# Patient Record
Sex: MALE | Race: WHITE | Employment: OTHER | URBAN - METROPOLITAN AREA
[De-identification: names, ages, dates, MRNs, and addresses within clinical notes are randomized per-mention and may not be internally consistent; named-entity substitution may affect disease eponyms.]

---

## 2018-10-11 ENCOUNTER — HOSPITAL ENCOUNTER (EMERGENCY)
Facility: CLINIC | Age: 53
Discharge: HOME | End: 2018-10-11
Attending: EMERGENCY MEDICINE | Admitting: EMERGENCY MEDICINE
Payer: MEDICARE

## 2018-10-11 VITALS
OXYGEN SATURATION: 99 % | TEMPERATURE: 97.7 F | DIASTOLIC BLOOD PRESSURE: 82 MMHG | HEIGHT: 73 IN | RESPIRATION RATE: 20 BRPM | WEIGHT: 189 LBS | SYSTOLIC BLOOD PRESSURE: 105 MMHG | BODY MASS INDEX: 25.05 KG/M2

## 2018-10-11 DIAGNOSIS — K59.03 DRUG-INDUCED CONSTIPATION: ICD-10-CM

## 2018-10-11 DIAGNOSIS — R33.9 URINARY RETENTION WITH INCOMPLETE BLADDER EMPTYING: ICD-10-CM

## 2018-10-11 LAB
ALBUMIN UR-MCNC: NEGATIVE MG/DL
ANION GAP SERPL CALCULATED.3IONS-SCNC: 7 MMOL/L (ref 3–14)
APPEARANCE UR: CLEAR
BILIRUB UR QL STRIP: NEGATIVE
BUN SERPL-MCNC: 11 MG/DL (ref 7–30)
CALCIUM SERPL-MCNC: 8.4 MG/DL (ref 8.5–10.1)
CHLORIDE SERPL-SCNC: 104 MMOL/L (ref 94–109)
CO2 SERPL-SCNC: 26 MMOL/L (ref 20–32)
COLOR UR AUTO: ABNORMAL
CREAT SERPL-MCNC: 1.03 MG/DL (ref 0.66–1.25)
GFR SERPL CREATININE-BSD FRML MDRD: 75 ML/MIN/1.7M2
GLUCOSE SERPL-MCNC: 93 MG/DL (ref 70–99)
GLUCOSE UR STRIP-MCNC: NEGATIVE MG/DL
HGB UR QL STRIP: NEGATIVE
KETONES UR STRIP-MCNC: NEGATIVE MG/DL
LEUKOCYTE ESTERASE UR QL STRIP: NEGATIVE
MUCOUS THREADS #/AREA URNS LPF: PRESENT /LPF
NITRATE UR QL: NEGATIVE
PH UR STRIP: 5 PH (ref 5–7)
POTASSIUM SERPL-SCNC: 3.7 MMOL/L (ref 3.4–5.3)
RBC #/AREA URNS AUTO: 1 /HPF (ref 0–2)
SODIUM SERPL-SCNC: 137 MMOL/L (ref 133–144)
SOURCE: ABNORMAL
SP GR UR STRIP: 1.01 (ref 1–1.03)
UROBILINOGEN UR STRIP-MCNC: 0 MG/DL (ref 0–2)
WBC #/AREA URNS AUTO: 4 /HPF (ref 0–5)

## 2018-10-11 PROCEDURE — 36415 COLL VENOUS BLD VENIPUNCTURE: CPT | Performed by: EMERGENCY MEDICINE

## 2018-10-11 PROCEDURE — 80048 BASIC METABOLIC PNL TOTAL CA: CPT | Performed by: EMERGENCY MEDICINE

## 2018-10-11 PROCEDURE — 51798 US URINE CAPACITY MEASURE: CPT

## 2018-10-11 PROCEDURE — 81001 URINALYSIS AUTO W/SCOPE: CPT | Performed by: EMERGENCY MEDICINE

## 2018-10-11 PROCEDURE — 51702 INSERT TEMP BLADDER CATH: CPT

## 2018-10-11 PROCEDURE — 99284 EMERGENCY DEPT VISIT MOD MDM: CPT | Mod: 25

## 2018-10-11 RX ORDER — ASPIRIN 81 MG
100 TABLET, DELAYED RELEASE (ENTERIC COATED) ORAL DAILY
Qty: 60 TABLET | Refills: 1 | Status: SHIPPED | OUTPATIENT
Start: 2018-10-11

## 2018-10-11 RX ORDER — HYDROXYZINE HCL 10 MG/5 ML
10 SOLUTION, ORAL ORAL 3 TIMES DAILY
COMMUNITY

## 2018-10-11 ASSESSMENT — ENCOUNTER SYMPTOMS: ABDOMINAL PAIN: 0

## 2018-10-11 NOTE — ED PROVIDER NOTES
History     Chief Complaint:  Urinary retention     HPI:   The history is provided by the patient and a parent.      Dominguez Jordan is a 53 year old male with a history of NIDDM and TBI who presents with his mother for evaluation of urinary retention. The patient had left foot surgery 2 days ago with [facility]. He has only been able to urinate small amounts at a time since the surgery. He reports his stream will stop suddenly and will unable to void further despite persistent urge to urinate. He has also not yet had bowel movement since surgery. He is using oxycodone for pain.    Patient went to [facility] today to follow up after surgery. He reported issues with urinary retention. They attempted to place catheter but were unable to do so prompting ER referral.     Upon arrival to the ER, patient was able to void about 150 ml. However, he continues to have urge to urinate and suprapubic discomfort without true abdominal pain. He has no other concerns. No fever, gross hematuria.    Allergies:  Imitrex - anaphylaxis       Medications:    Glipizide   Atarax   Lisinopril   Nortriptyline   Oxycodone     Past Medical History:    Diabetes mellitus   Seizures   TBI     Past Surgical History:    Foot surgery 10/9/18, left     Family History:    History reviewed. No pertinent family history.      Social History:  Presents with mother    Tobacco use: Never smoker   Alcohol use: No   PCP: [facility]       Review of Systems   Constitutional: Negative for fever.   Gastrointestinal: Positive for constipation. Negative for abdominal pain.   Genitourinary: Positive for decreased urine volume and difficulty urinating. Negative for hematuria.   All other systems reviewed and are negative.    Physical Exam     Patient Vitals for the past 24 hrs:   BP Temp Temp src Heart Rate Resp SpO2 Height Weight   10/11/18 1447 - 97.7  F (36.5  C) - - - - - -   10/11/18 1441 (!) 142/96 - - - - - - -   10/11/18  "1438 - - Oral 69 18 98 % 1.854 m (6' 1\") 85.7 kg (189 lb)        Physical Exam  General: Well-developed and well-nourished. Well appearing middle aged  man. Cooperative.  Head:  Atraumatic.  Eyes:  Conjunctivae, lids, and sclerae are normal.  ENT:    Normal nose. Moist mucous membranes.  Neck:  Supple. Normal range of motion.  CV:  Regular rate and rhythm. Normal heart sounds with no murmurs, rubs, or gallops detected.  Resp:  No respiratory distress. Clear to auscultation bilaterally without decreased breath sounds, wheezing, rales, or rhonchi.  GI:  Soft. Non-distended. Discomfort with suprapubic palpation increasing urge to void.  :  No perianal masses, tenderness, fluctuance, or hemorrhoids. Very small amount of medium brown stool in rectal vault. No fecal impaction.  MS:  Left lower leg in splint post-operatively.  Skin:  Warm. Non-diaphoretic. No pallor.  Neuro:  Awake and alert. Normal strength.  Psych: Normal mood and affect. Normal speech.  Vitals reviewed.    Emergency Department Course     Laboratory:  BMP: Calcium 8.4 (L), ow WNL (Creatinine 1.03)   UA with Microscopic: Mucous present, ow Negative      Emergency Department Course:  Past medical records, nursing notes, and vitals reviewed.  1453: I performed an exam of the patient and obtained history, as documented above.    Blood drawn. This was sent to the lab for further testing, results above.   The patient provided a urine sample here in the emergency department. This was sent for laboratory testing, findings above.     1615: I performed a rectal exam. We will try a bishop catheter.     1643: I rechecked the patient. Bishop was established with a lot of urine output (1L). He is feeling better and is ready to go home. Findings and plan explained to the patient and mother. Patient discharged home with instructions regarding supportive care, medications, and reasons to return. The importance of close follow-up was reviewed.      Impression & " Plan      Medical Decision Making:  Dominguez is a 53-year-old man who is postoperative day 2 after orthopedic surgery on his left foot.  He went to orthopedics for some issues with his postoperative recovery and reported to them he is having difficulty completely emptying his bladder.  He continues to feel urge to void.  Reportedly they were unable to pass a urinary catheter and sent patient for further evaluation.  He has no other concerns or complaints though he does note he is constipated as he is using oxycodone.  Patient has no fecal impaction on exam which is remarkable only for discomfort and some fullness in the suprapubis.  He was able to void approximately 150 cc though bladder scan remains with greater than 500 ml remaining.  A Emerson catheter was placed return of 1 L of urine and relief of symptoms.  It is of note that the urinalysis is unremarkable without hematuria or infection.  He also has normal electrolytes with a creatinine of 1.03.  Most likely patient's urinary retention is related to recent general anesthesia in addition to constipation and use of oxycodone.  I do not believe he requires further workup at this time.  However, he will require follow-up with urology for Emerson catheter removal.  Given [facility] had a difficult time placing a catheter I suspect he has BPH and he may require further evaluation of this by urology as well.  I have recommended patient use stool softener while on oxycodone.  I have provided strict return precautions including dysfunction of his Emerson catheter or fever.  I have answered all the patient and his mother's questions and they verbalized understanding.  He is amenable to discharge.    Diagnosis:    ICD-10-CM    1. Urinary retention with incomplete bladder emptying R33.9    2. Drug-induced constipation K59.03        Disposition:  Discharged home with plan as outlined.     Discharge Medications:  New Prescriptions    DOCUSATE SODIUM (COLACE) 100 MG TABLET     Take 100 mg by mouth daily     Scribe Disclosure:   I, David Leigh, am serving as a scribe at 2:53 PM on 10/11/2018 to document services personally performed by Paradise Lugo MD based on my observations and the provider's statements to me.       Paradise Lugo MD  10/11/2018   [facility] EMERGENCY DEPARTMENT       Paradise Lugo MD  10/12/18 1800

## 2018-10-11 NOTE — ED TRIAGE NOTES
Pt c/o urinary retention. LLE foot injury pin surgery Tuesday.  Pt has been having difficulty urinating since. Able to produce urine but feels urge to urinate. ABC in tact. A/OX4

## 2018-10-11 NOTE — DISCHARGE INSTRUCTIONS
Follow-up with urology for Emerson catheter removal and further evaluation.  Take a stool softener or other over-the-counter bowel regimen while you are on oxycodone.  Return immediately with any new concerns including dysfunction of Emerson catheter.  Follow up with [facility] as scheduled.

## 2018-10-11 NOTE — ED NOTES
Emerson instructions given to patient family and pt verbalized understanding of discharge instructions

## 2018-10-11 NOTE — ED AVS SNAPSHOT
[facility] Emergency Department    [address]    [place] [address]    Phone:  [phone]    Fax:  [phone]                                       Dominguez Jordan   MRN: [id number]    Department:  [facility] Emergency Department   Date of Visit:  10/11/2018           After Visit Summary Signature Page     I have received my discharge instructions, and my questions have been answered. I have discussed any challenges I see with this plan with the nurse or doctor.    ..........................................................................................................................................  Patient/Patient Representative Signature      ..........................................................................................................................................  Patient Representative Print Name and Relationship to Patient    ..................................................               ................................................  Date                                   Time    ..........................................................................................................................................  Reviewed by Signature/Title    ...................................................              ..............................................  Date                                               Time          22EPIC Rev 08/18

## 2018-10-11 NOTE — ED AVS SNAPSHOT
[facility] Emergency Department    [address]    [facility] [address]    Phone:  [phone]    Fax:  [phone]                                       Dmoinguez Jordan   MRN: [id number]    Department:  [facility] Emergency Department   Date of Visit:  10/11/2018           Patient Information     Date Of Birth          1965        Your diagnoses for this visit were:     Urinary retention with incomplete bladder emptying     Drug-induced constipation        You were seen by Paradise Lugo MD.      Follow-up Information     Follow up with [facility]. Call in 1 day.    Contact information:    [address]  [address]  [facility] [address] [phone]        Follow up with [facility].    Why:  As scheduled    Contact information:    [address]  [facility] [address] [phone]        Follow up with [facility].    Why:  As needed    Contact information:    [address] [facility] [address]  [phone]          Follow up with [facility] Emergency Department.    Specialty:  EMERGENCY MEDICINE    Why:  If symptoms worsen    Contact information:    [address]  [facility] [address] [phone]        Discharge Instructions       Follow-up with urology for Cisse catheter removal and further evaluation.  Take a stool softener or other over-the-counter bowel regimen while you are on oxycodone.  Return immediately with any new concerns including dysfunction of Cisse catheter.  Follow up with [facility] as scheduled.    Discharge References/Attachments     CONSTIPATION (ADULT) (ENGLISH)    CISSE CATHETER, CARE (ENGLISH)    URINARY RETENTION, MALE (ENGLISH)      24 Hour Appointment Hotline       To make an appointment at any [facility] clinic, call [phone] ([phone]). If you don't have a family doctor or clinic, we will help you  find one. [facility] are conveniently located to serve the needs of you and your family.             Review of your medicines      START taking        Dose / Directions Last dose taken    docusate sodium 100 MG tablet   Commonly known as:  COLACE   Dose:  100 mg   Quantity:  60 tablet        Take 100 mg by mouth daily   Refills:  1          Our records show that you are taking the medicines listed below. If these are incorrect, please call your family doctor or clinic.        Dose / Directions Last dose taken    GLIPIZIDE PO        Refills:  0        hydrOXYzine 10 MG/5ML syrup   Commonly known as:  ATARAX   Dose:  10 mg        Take 10 mg by mouth 3 times daily   Refills:  0        LISINOPRIL PO        Refills:  0        NORTRIPTYLINE HCL PO        Refills:  0        OXYCODONE HCL PO        Refills:  0                Information about OPIOIDS     PRESCRIPTION OPIOIDS: WHAT YOU NEED TO KNOW   We gave you an opioid (narcotic) pain medicine. It is important to manage your pain, but opioids are not always the best choice. You should first try all the other options your care team gave you. Take this medicine for as short a time (and as few doses) as possible.    Some activities can increase your pain, such as bandage changes or therapy sessions. It may help to take your pain medicine 30 to 60 minutes before these activities. Reduce your stress by getting enough sleep, working on hobbies you enjoy and practicing relaxation or meditation. Talk to your care team about ways to manage your pain beyond prescription opioids.    These medicines have risks:    DO NOT drive when on new or higher doses of pain medicine. These medicines can affect your alertness and reaction times, and you could be arrested for driving under the influence (DUI). If you need to use opioids long-term, talk to your care team about driving.    DO NOT operate heavy machinery    DO NOT do any other dangerous activities while taking these  medicines.    DO NOT drink any alcohol while taking these medicines.     If the opioid prescribed includes acetaminophen, DO NOT take with any other medicines that contain acetaminophen. Read all labels carefully. Look for the word  acetaminophen  or  Tylenol.  Ask your pharmacist if you have questions or are unsure.    You can get addicted to pain medicines, especially if you have a history of addiction (chemical, alcohol or substance dependence). Talk to your care team about ways to reduce this risk.    All opioids tend to cause constipation. Drink plenty of water and eat foods that have a lot of fiber, such as fruits, vegetables, prune juice, apple juice and high-fiber cereal. Take a laxative (Miralax, milk of magnesia, Colace, Senna) if you don t move your bowels at least every other day. Other side effects include upset stomach, sleepiness, dizziness, throwing up, tolerance (needing more of the medicine to have the same effect), physical dependence and slowed breathing.    Store your pills in a secure place, locked if possible. We will not replace any lost or stolen medicine. If you don t finish your medicine, please throw away (dispose) as directed by your pharmacist. The [place] [employer]ution [employer] has more information about safe disposal: https://www.pca.[facility].mn.us/living-green/managing-unwanted-medications        Prescriptions were sent or printed at these locations (1 Prescription)                   Other Prescriptions                Printed at Department/Unit printer (1 of 1)         docusate sodium (COLACE) 100 MG tablet                Procedures and tests performed during your visit     Basic metabolic panel    Continue indwelling urinary catheter (Emerson)    UA with Microscopic      Orders Needing Specimen Collection     None      Pending Results     No orders found from 10/9/2018 to 10/12/2018.            Pending Culture Results     No orders found from 10/9/2018 to 10/12/2018.             Pending Results Instructions     If you had any lab results that were not finalized at the time of your Discharge, you can call the ED Lab Result RN at [phone]. You will be contacted by this team for any positive Lab results or changes in treatment. The nurses are available 7 days a week from 10A to 6:30P.  You can leave a message 24 hours per day and they will return your call.        Test Results From Your Hospital Stay        10/11/2018  3:45 PM      Component Results     Component Value Ref Range & Units Status    Color Urine Straw  Final    Appearance Urine Clear  Final    Glucose Urine Negative NEG^Negative mg/dL Final    Bilirubin Urine Negative NEG^Negative Final    Ketones Urine Negative NEG^Negative mg/dL Final    Specific Gravity Urine 1.006 1.003 - 1.035 Final    Blood Urine Negative NEG^Negative Final    pH Urine 5.0 5.0 - 7.0 pH Final    Protein Albumin Urine Negative NEG^Negative mg/dL Final    Urobilinogen mg/dL 0.0 0.0 - 2.0 mg/dL Final    Nitrite Urine Negative NEG^Negative Final    Leukocyte Esterase Urine Negative NEG^Negative Final    Source Midstream Urine  Final    WBC Urine 4 0 - 5 /HPF Final    RBC Urine 1 0 - 2 /HPF Final    Mucous Urine Present (A) NEG^Negative /LPF Final         10/11/2018  3:48 PM      Component Results     Component Value Ref Range & Units Status    Sodium 137 133 - 144 mmol/L Final    Potassium 3.7 3.4 - 5.3 mmol/L Final    Chloride 104 94 - 109 mmol/L Final    Carbon Dioxide 26 20 - 32 mmol/L Final    Anion Gap 7 3 - 14 mmol/L Final    Glucose 93 70 - 99 mg/dL Final    Urea Nitrogen 11 7 - 30 mg/dL Final    Creatinine 1.03 0.66 - 1.25 mg/dL Final    GFR Estimate 75 >60 mL/min/1.7m2 Final    Non  GFR Calc    GFR Estimate If Black >90 >60 mL/min/1.7m2 Final    African American GFR Calc    Calcium 8.4 (L) 8.5 - 10.1 mg/dL Final                Clinical Quality Measure: Blood Pressure Screening     Your blood pressure was checked while you were in  "the emergency department today. The last reading we obtained was  BP: (!) 142/96 . Please read the guidelines below about what these numbers mean and what you should do about them.  If your systolic blood pressure (the top number) is less than 120 and your diastolic blood pressure (the bottom number) is less than 80, then your blood pressure is normal. There is nothing more that you need to do about it.  If your systolic blood pressure (the top number) is 120-139 or your diastolic blood pressure (the bottom number) is 80-89, your blood pressure may be higher than it should be. You should have your blood pressure rechecked within a year by a primary care provider.  If your systolic blood pressure (the top number) is 140 or greater or your diastolic blood pressure (the bottom number) is 90 or greater, you may have high blood pressure. High blood pressure is treatable, but if left untreated over time it can put you at risk for heart attack, stroke, or kidney failure. You should have your blood pressure rechecked by a primary care provider within the next 4 weeks.  If your provider in the emergency department today gave you specific instructions to follow-up with your doctor or provider even sooner than that, you should follow that instruction and not wait for up to 4 weeks for your follow-up visit.        Thank you for choosing [facility]       Thank you for choosing [facility] for your care. Our goal is always to provide you with excellent care. Hearing back from our patients is one way we can continue to improve our services. Please take a few minutes to complete the written survey that you may receive in the mail after you visit with us. Thank you!        [facility] Information     [employer] lets you send messages to your doctor, view your test results, renew your prescriptions, schedule appointments and more. To sign up, go to [email or web address] . Click on \"Log in\" on the left side of the screen, which will take you " "to the Welcome page. Then click on \"Sign up Now\" on the right side of the page.     You will be asked to enter the access code listed below, as well as some personal information. Please follow the directions to create your username and password.     Your access code is: [email or web address]  Expires: 2019  5:03 PM     Your access code will  in 90 days. If you need help or a new code, please call your [facility] clinic or [phone].        Care EveryWhere ID     This is your Care EveryWhere ID. This could be used by other organizations to access your [facility] medical records  [id number]        Equal Access to Services     DMITRIY LOPEZ : Isaiah Sharpe, vera dooley, maritza valdes, ning devlin. [facility] [phone].    ATENCIÓN: Si habla español, tiene a castillo disposición servicios gratuitos de asistencia lingüística. Llame al [phone].    We comply with applicable federal civil rights laws and [place] laws. We do not discriminate on the basis of race, color, national origin, age, disability, sex, sexual orientation, or gender identity.            After Visit Summary       This is your record. Keep this with you and show to your community pharmacist(s) and doctor(s) at your next visit.                  "

## 2018-10-12 ASSESSMENT — ENCOUNTER SYMPTOMS
CONSTIPATION: 1
DIFFICULTY URINATING: 1
FEVER: 0
HEMATURIA: 0

## 2018-10-15 ENCOUNTER — OFFICE VISIT (OUTPATIENT)
Dept: UROLOGY | Facility: CLINIC | Age: 53
End: 2018-10-15
Payer: COMMERCIAL

## 2018-10-15 VITALS
HEIGHT: 73 IN | HEART RATE: 74 BPM | SYSTOLIC BLOOD PRESSURE: 132 MMHG | OXYGEN SATURATION: 99 % | WEIGHT: 190 LBS | BODY MASS INDEX: 25.18 KG/M2 | DIASTOLIC BLOOD PRESSURE: 88 MMHG

## 2018-10-15 DIAGNOSIS — R33.9 URINARY RETENTION: Primary | ICD-10-CM

## 2018-10-15 PROCEDURE — 99203 OFFICE O/P NEW LOW 30 MIN: CPT | Mod: 25 | Performed by: PHYSICIAN ASSISTANT

## 2018-10-15 PROCEDURE — 51700 IRRIGATION OF BLADDER: CPT | Performed by: PHYSICIAN ASSISTANT

## 2018-10-15 RX ORDER — TAMSULOSIN HYDROCHLORIDE 0.4 MG/1
0.4 CAPSULE ORAL DAILY
Qty: 30 CAPSULE | Refills: 3 | Status: SHIPPED | OUTPATIENT
Start: 2018-10-15

## 2018-10-15 RX ORDER — HYDROXYZINE PAMOATE 25 MG/1
CAPSULE ORAL
COMMUNITY
Start: 2018-10-12

## 2018-10-15 ASSESSMENT — PAIN SCALES - GENERAL: PAINLEVEL: SEVERE PAIN (7)

## 2018-10-15 NOTE — NURSING NOTE
Chief Complaint   Patient presents with     Clinic Care Coordination - Follow-up     Pt here for TOV   Pt presented today for a trial of void.  Approximately 350 mL of normal saline instilled into bladder via catheter.  Patient stated he had urge to urinate and catheter was removed without difficulty.  Patient was given a graduated cylinder to measure urine output.  Patient voided approximately 150 mL of clear urine.    Cipro 500 given per protocol: No, per Adriana    Patient did tolerate procedure well.    Teaching done with patient verbally as where to call or go if pain, fever, or unable to urinate post catheter removal.        Shantel Payne, CMA

## 2018-10-15 NOTE — MR AVS SNAPSHOT
"              After Visit Summary   10/15/2018    Dominguez Jordan    MRN: [id number]           Patient Information     Date Of Birth          1965        Visit Information        Provider Department      10/15/2018 2:30 PM Adriana Chacko PA-C [facility]        Today's Diagnoses     Urinary retention    -  1      Care Instructions    Start tamsulosin tonight. One capsule daily.     See you in 1 month.             Follow-ups after your visit        Follow-up notes from your care team     Return in about 4 weeks (around 11/12/2018).      Your next 10 appointments already scheduled     Nov 14, 2018  2:00 PM CST   Return Visit with Adriana Chacko PA-C   [facility] ([facility])    [address]  [address]  [facility] [address] [phone]              Who to contact     If you have questions or need follow up information about today's clinic visit or your schedule please contact [facility] directly at [phone].  Normal or non-critical lab and imaging results will be communicated to you by Phigitalhart, letter or phone within 4 business days after the clinic has received the results. If you do not hear from us within 7 days, please contact the clinic through Teleportt or phone. If you have a critical or abnormal lab result, we will notify you by phone as soon as possible.  Submit refill requests through Minka or call your pharmacy and they will forward the refill request to us. Please allow 3 business days for your refill to be completed.          Additional Information About Your Visit        MyChart Information     Minka lets you send messages to your doctor, view your test results, renew your prescriptions, schedule appointments and more. To sign up, go to [email or web address] . Click on \"Log in\" on the left side of the screen, which will " "take you to the Welcome page. Then click on \"Sign up Now\" on the right side of the page.     You will be asked to enter the access code listed below, as well as some personal information. Please follow the directions to create your username and password.     Your access code is: [email or web address]  Expires: 2019  5:03 PM     Your access code will  in 90 days. If you need help or a new code, please call your [place] clinic or [phone].        Care EveryWhere ID     This is your Care EveryWhere ID. This could be used by other organizations to access your [place] medical records  [id number]        Your Vitals Were     Pulse Height Pulse Oximetry BMI (Body Mass Index)          74 1.854 m (6' 1\") 99% 25.07 kg/m2         Blood Pressure from Last 3 Encounters:   10/15/18 132/88   10/11/18 105/82    Weight from Last 3 Encounters:   10/15/18 86.2 kg (190 lb)   10/11/18 85.7 kg (189 lb)              Today, you had the following     No orders found for display         Today's Medication Changes          These changes are accurate as of 10/15/18  3:24 PM.  If you have any questions, ask your nurse or doctor.               Start taking these medicines.        Dose/Directions    tamsulosin 0.4 MG capsule   Commonly known as:  FLOMAX   Used for:  Urinary retention   Started by:  Adriana Chacko PA-C        Dose:  0.4 mg   Take 1 capsule (0.4 mg) by mouth daily   Quantity:  30 capsule   Refills:  3            Where to get your medicines      These medications were sent to [facility] [address] [address]     Phone:  [phone]     tamsulosin 0.4 MG capsule                Primary Care Provider Office Phone # Fax #    [facility] [phone]       [address] [place] [address]        Equal Access to Services     DMITRIY LOPEZ AH: Isaiah vazquez Sonena, waaxda luqadaha, qaybta kaalcorona valdes, ning connolly " vanessa mendoza'aan ah. So [facility] [phone].    ATENCIÓN: Si habla burt, tiene a castillo disposición servicios gratuitos de asistencia lingüística. Marixa al [phone].    We comply with applicable federal civil rights laws and [place] laws. We do not discriminate on the basis of race, color, national origin, age, disability, sex, sexual orientation, or gender identity.            Thank you!     Thank you for choosing [facility] [place]  for your care. Our goal is always to provide you with excellent care. Hearing back from our patients is one way we can continue to improve our services. Please take a few minutes to complete the written survey that you may receive in the mail after your visit with us. Thank you!             Your Updated Medication List - Protect others around you: Learn how to safely use, store and throw away your medicines at www.disposemymeds.org.          This list is accurate as of 10/15/18  3:24 PM.  Always use your most recent med list.                   Brand Name Dispense Instructions for use Diagnosis    docusate sodium 100 MG tablet    COLACE    60 tablet    Take 100 mg by mouth daily        GLIPIZIDE PO           hydrOXYzine 10 MG/5ML syrup    ATARAX     Take 10 mg by mouth 3 times daily        hydrOXYzine 25 MG capsule    VISTARIL          LISINOPRIL PO           NORTRIPTYLINE HCL PO           OXYCODONE HCL PO           tamsulosin 0.4 MG capsule    FLOMAX    30 capsule    Take 1 capsule (0.4 mg) by mouth daily    Urinary retention

## 2018-10-15 NOTE — LETTER
"10/15/2018       RE: Dominguez Jordan  [address] [facility] [address]     Dear Colleague,    Thank you for referring your patient, Dominguez Jordan, to the [facility] [place] [facility]. Please see a copy of my visit note below.    CC: Urinary retention.    HPI: It is a pleasure to see . Dominguez Jordan, a 53 year old male seen today in the urology clinic in consultation from Dr. Goode of  ER for evaluation of urinary retention following left foot surgery. This developed acutely requiring Emerson catheter placement on 10/11/18. This has been draining well with pale, yellow urine. The patient is tolerating the catheter without issue. No clots of hematuria noted.    The patient has no prior history of AUR or similar symptoms. At baseline, patient does note some hesitancy, frequency. Currently denies fevers, chills, N/V, abdominal/back pain.    No PSA on record.     Past Medical History:   Diagnosis Date     Diabetes (H)      Seizures (H)      TBI (traumatic brain injury) (H)      Past Surgical History:   Procedure Laterality Date     ORTHOPEDIC SURGERY Left 10/09/2018     Current Outpatient Prescriptions   Medication Sig Dispense Refill     docusate sodium (COLACE) 100 MG tablet Take 100 mg by mouth daily 60 tablet 1     GLIPIZIDE PO        hydrOXYzine (ATARAX) 10 MG/5ML syrup Take 10 mg by mouth 3 times daily       LISINOPRIL PO        NORTRIPTYLINE HCL PO        OXYCODONE HCL PO        Allergies   Allergen Reactions     Imitrex [Sumatriptan] Anaphylaxis     Family History: There is no h/o  malignancy.  There is no h/o urolithiasis.     Social History: The patient does not smoke cigarettes.  Denies EtOH and illicit drug use.      PHYSICAL EXAM:   /88 (BP Location: Left arm, Patient Position: Sitting, Cuff Size: Adult Regular)  Pulse 74  Ht 1.854 m (6' 1\")  Wt 86.2 kg (190 lb)  SpO2 99%  BMI 25.07 " kg/m2    GENERAL: Well groomed/well developed/well nourished male in NAD.  HEENT: EOMI, AT, NC.  SKIN: Warm to touch, dry.  No visible rashes or lesions.  RESP: No increased respiratory effort.  LYMPH: No LE edema.  ABD: Soft, NT, ND.  No CVAT.  MS: Full ROM in extremities.  : Emerson catheter indwelling draining yellow urine.  NEURO: Alert and oriented x 3.  PSYCH: Normal mood and affect, pleasant and agreeable during interview and exam.    PROCEDURE: A trial of void was performed by nursing staff today in the clinic.  The patient's Emerson leg bag was disconnected and 350 cc of sterile water were infused into the patient's bladder via gravity drainage, which the patient tolerated fine.  The Emerson balloon was decompressed and the catheter was then removed.  After a few minutes Mr. Jordan voided 200 cc.  The patient did pass today's trial of void and the catheter did not need to be replaced.      REVIEW OF OUTSIDE RECORDS: 5 minutes spent reviewing previous/outside records.    ASSESSMENT/PLAN:  53 year old male who developed acute urinary retention requiring Emerson catheter placement. Has not been taking tamsulosin daily.   The patient successfully passed a trial of void today and should start taking Flomax daily.  The patient should return for a follow up office visit in 1 month for PVR and an AUA symptom score.      Eventually needs KOMAL and PSA.     Should the patient continue to have voiding difficulty, the next appropriate studies would be cystoscopy and/or videourodynamics to better assess bladder function.      I have enjoyed participating in the medical care of this patient.  Please do not hesitate to contact me with any questions or concerns.      Adriana Chacko PA-C  [facility]    30 min spent with the patient, >50% of this time was spent in a face-to-face manner and on coordination of care of urinary retention.

## 2018-10-15 NOTE — PROGRESS NOTES
"CC: Urinary retention.    HPI: It is a pleasure to see . Dominguez Jordan, a 53 year old male seen today in the urology clinic in consultation from Dr. Goode of  ER for evaluation of urinary retention following left foot surgery. This developed acutely requiring Emerson catheter placement on 10/11/18. This has been draining well with pale, yellow urine. The patient is tolerating the catheter without issue. No clots of hematuria noted.    The patient has no prior history of AUR or similar symptoms. At baseline, patient does note some hesitancy, frequency. Currently denies fevers, chills, N/V, abdominal/back pain.    No PSA on record.     Past Medical History:   Diagnosis Date     Diabetes (H)      Seizures (H)      TBI (traumatic brain injury) (H)      Past Surgical History:   Procedure Laterality Date     ORTHOPEDIC SURGERY Left 10/09/2018     Current Outpatient Prescriptions   Medication Sig Dispense Refill     docusate sodium (COLACE) 100 MG tablet Take 100 mg by mouth daily 60 tablet 1     GLIPIZIDE PO        hydrOXYzine (ATARAX) 10 MG/5ML syrup Take 10 mg by mouth 3 times daily       LISINOPRIL PO        NORTRIPTYLINE HCL PO        OXYCODONE HCL PO        Allergies   Allergen Reactions     Imitrex [Sumatriptan] Anaphylaxis     Family History: There is no h/o  malignancy.  There is no h/o urolithiasis.     Social History: The patient does not smoke cigarettes.  Denies EtOH and illicit drug use.      ROS: A comprehensive 14 point ROS was obtained and was  otherwise negative except for that outlined above in the HPI.    PHYSICAL EXAM:   /88 (BP Location: Left arm, Patient Position: Sitting, Cuff Size: Adult Regular)  Pulse 74  Ht 1.854 m (6' 1\")  Wt 86.2 kg (190 lb)  SpO2 99%  BMI 25.07 kg/m2    GENERAL: Well groomed/well developed/well nourished male in NAD.  HEENT: EOMI, AT, NC.  SKIN: Warm to touch, dry.  No visible rashes or lesions.  RESP: No increased respiratory effort.  LYMPH: No LE " edema.  ABD: Soft, NT, ND.  No CVAT.  MS: Full ROM in extremities.  : Emerson catheter indwelling draining yellow urine.  NEURO: Alert and oriented x 3.  PSYCH: Normal mood and affect, pleasant and agreeable during interview and exam.    PROCEDURE: A trial of void was performed by nursing staff today in the clinic.  The patient's Emerson leg bag was disconnected and 350 cc of sterile water were infused into the patient's bladder via gravity drainage, which the patient tolerated fine.  The Emerson balloon was decompressed and the catheter was then removed.  After a few minutes Mr. Jordan voided 200 cc.  The patient did pass today's trial of void and the catheter did not need to be replaced.      REVIEW OF OUTSIDE RECORDS: 5 minutes spent reviewing previous/outside records.    ASSESSMENT/PLAN:  53 year old male who developed acute urinary retention requiring Emerson catheter placement. Has not been taking tamsulosin daily.   The patient successfully passed a trial of void today and should start taking Flomax daily.  The patient should return for a follow up office visit in 1 month for PVR and an AUA symptom score.      Eventually needs KOMAL and PSA.     Should the patient continue to have voiding difficulty, the next appropriate studies would be cystoscopy and/or videourodynamics to better assess bladder function.      I have enjoyed participating in the medical care of this patient.  Please do not hesitate to contact me with any questions or concerns.      Adriana Chacko PA-C  [facility]    30 min spent with the patient, >50% of this time was spent in a face-to-face manner and on coordination of care of urinary retention.

## 2018-10-29 ENCOUNTER — HOSPITAL ENCOUNTER (EMERGENCY)
Facility: CLINIC | Age: 53
Discharge: HOME | End: 2018-10-29
Attending: EMERGENCY MEDICINE | Admitting: EMERGENCY MEDICINE
Payer: MEDICARE

## 2018-10-29 ENCOUNTER — APPOINTMENT (OUTPATIENT)
Dept: ULTRASOUND IMAGING | Facility: CLINIC | Age: 53
End: 2018-10-29
Attending: EMERGENCY MEDICINE
Payer: MEDICARE

## 2018-10-29 VITALS
DIASTOLIC BLOOD PRESSURE: 83 MMHG | HEIGHT: 73 IN | RESPIRATION RATE: 18 BRPM | WEIGHT: 190 LBS | TEMPERATURE: 97.5 F | OXYGEN SATURATION: 97 % | SYSTOLIC BLOOD PRESSURE: 114 MMHG | BODY MASS INDEX: 25.18 KG/M2

## 2018-10-29 DIAGNOSIS — R25.2 CRAMP IN LOWER LEG: ICD-10-CM

## 2018-10-29 DIAGNOSIS — Z48.89 ENCOUNTER FOR POSTOPERATIVE WOUND CHECK: ICD-10-CM

## 2018-10-29 PROCEDURE — 93971 EXTREMITY STUDY: CPT | Mod: LT

## 2018-10-29 PROCEDURE — 99284 EMERGENCY DEPT VISIT MOD MDM: CPT | Mod: 25

## 2018-10-29 ASSESSMENT — ENCOUNTER SYMPTOMS
FEVER: 0
VOMITING: 0
COLOR CHANGE: 1
NUMBNESS: 0
NAUSEA: 0

## 2018-10-29 NOTE — ED TRIAGE NOTES
Pt had tendon surgery on left foot on Oct 9th.  He now has increased pain in foot and swelling when foot is down.  Pt is sent to ED to rule out blood clot.

## 2018-10-29 NOTE — ED PROVIDER NOTES
History     Chief Complaint:  Foot Pain    The history is provided by the patient and a friend.      Dominguez Jordan is a 53 year old male with a history of NIDDM and TBI who presents with left foot pain, status post left foot tendon surgery on 10/09. Patient had his splint and stitches removed 5 days ago. Things seem to be going well though he continues to have pain in the foot requiring 1 tablet of oxycodone every 4 hours. However, yesterday the patient's ex-wife noticed a bluish discoloration when he was sitting with his feet hanging down. The discoloration seemed to start at the heel and move around to the foot and up towards the ankle. He did have associated swelling so since this incident the patient has been very careful about keeping the foot elevated. There has been no further episodes of discoloration and swelling is improved. He did, however, have a pain in is left lower leg/ankle last night that was cramping in quality and persists mildly today. Thus, today, patient's ex-wife called [facility] and described these symptoms. He was referred to the ER given concern for DVT. Of note, the surgical site is somewhat red. The patient and ex-wife state they believe this is from him itching the site. The ex-wife has picture of day the stitches were removed and there is a similar amount of redness on that photo 5 days ago. The patient notes pain has not increased in the last 5 days nor has he had any fever. He denies numbness or tingling. He has had no nausea or vomiting.    Allergies:  Imitrex [Sumatriptan]  Depakote [Valproic Acid]    Medications:    Glipizide  Lisinopril  Colace  Vistaril  Atarax    Past Medical History:    Diabetes  TBI  Seizures     Past Surgical History:    Orthopedic surgery, left foot    Family History:    History reviewed. No pertinent family history.     Social History:  The patient was accompanied to the ED by ex-wife.  Smoking Status: Never  Smokeless Tobacco:  "Never  Alcohol Use: No  Marital Status:  Single   PCP: [facility]       Review of Systems   Constitutional: Negative for fever.   Cardiovascular: Positive for leg swelling.   Gastrointestinal: Negative for nausea and vomiting.   Musculoskeletal: Positive for myalgias (left calf/ankle cramp).   Skin: Positive for color change and wound (surgical).   Neurological: Negative for numbness.   All other systems reviewed and are negative.    Physical Exam     Patient Vitals for the past 24 hrs:   BP Temp Temp src Heart Rate Resp SpO2 Height Weight   10/29/18 1519 114/83 97.5  F (36.4  C) Temporal 82 18 97 % 1.854 m (6' 1\") 86.2 kg (190 lb)     Physical Exam  General: Well-developed and well-nourished. Well appearing middle aged  man. Cooperative.  Head:  Atraumatic.  Eyes:  Conjunctivae, lids, and sclerae are normal.  ENT:    Normal nose. Moist mucous membranes.  Neck:  Supple. Normal range of motion.  CV:  DP 2+ bilaterally with distal capillary refill <3 seconds throughout.  Resp:  No respiratory distress.   GI:  Non-distended.    MS:  Normal ROM. No calf tenderness or edema. Mild edema to the left foot and second toe without color change aside from focal erythema around a well healing surgical site (photo below) with no significant warmth or tenderness. Cannot express discharge from wound. Compartments soft. No focal tenderness to palpation of the lower leg, ankle, or foot on the left.   Skin:  Warm. Non-diaphoretic. No pallor. See photo below.  Neuro: Awake and alert. Normal strength and sensation to the left foot.  Psych:  Normal mood and affect. Normal speech.  Vitals reviewed.            Emergency Department Course     Imaging:  Radiology findings were communicated with the patient who voiced understanding of the findings.  US Lower Extremity Venous Duplex Left:  No DVT demonstrated.    HELEN REA MD    Emergency Department Course:  Nursing notes and vitals reviewed.    The patient was sent " "for an ultrasound of the lower extremity while in the emergency department, results above.     1532: I performed an exam of the patient as documented above.     1655: Patient rechecked and updated.     Findings and plan explained to the patient. Patient discharged home with instructions regarding supportive care, medications, and reasons to return. The importance of close follow-up was reviewed.     Impression & Plan      Medical Decision Making:  Dominguez is a 53-year-old man who had a orthopedic left foot surgery and is postoperative day 20 presenting with concern for DVT.  Patient had his splint and sutures removed as well as a pin on postoperative day 15.  Since that time he has continued using oxycodone every 4 hours which does relieve his pain.  It has not significantly worsened recently though he did have left calf pain described as a cramp yesterday that persists mildly now.  This, in combination with color change of the foot which his ex-wife describes as \"blue\" prompted a call to the [facility] and referral for evaluation for DVT.  Ex-wife does note that this skin discoloration improved when they elevated the extremity and she believes the swelling has improved as well.  On exam, there is no significant swelling and no skin discoloration.  He is neurovascularly intact with 2+ DP pulse.  Compartments are soft and there is no significant calf tenderness or swelling.  There is a small amount of redness around the surgical site itself though it is not latanya cellulitis and patient's ex-wife shows me a picture on her phone of the day the sutures removed and this redness is unchanged from that time.  He does report that it is been somewhat itchy and he has been itching at this area and I believe this redness is unlikely to represent a true postoperative surgical site infection or cellulitis.  Patient was sent for duplex US and, fortunately, this shows no DVT.  At this time, there is no significant concern for " "postoperative site infection, compartment syndrome, or DVT.  Further imaging is not indicated, particularly as his pain has not particularly worsened recently.  I do not believe antibiotics are indicated for the redness around the surgical site though I have demarcated the area and recommended close monitoring.  If the redness extends, he would warrant antibiotics at that time.  However, it has not extended in the last 5 days and he has no systemic signs of illness such as fever.  Exact etiology of the \"blue\" discoloration described by ex-wife is unclear.  I suspect it was related to dependent edema given it improved with elevation.  I have recommended they continue elevating and continue his oxycodone.  I have recommended they monitor closely for a blue discoloration as they noticed before and also monitoring the redness that was demarcated as above with strict return precautions for surgical site infection.  He does not have an appointment for another 3-4 weeks with [facility] so I have recommended they call today or tomorrow to get first available appointment for recheck of his surgical site and to discuss his ER visit and ongoing pain with calf cramp and reported skin discoloration.  I answered all the patient and his ex-wife's questions and they verbalized understanding.  They are amenable to discharge with [facility] follow-up and strict return precautions.    Diagnosis:    ICD-10-CM    1. Cramp in lower leg R25.2    2. Encounter for postoperative wound check Z48.89        Disposition:  discharged home      Dorene Johnson  10/29/2018   [facility] EMERGENCY DEPARTMENT  I, Dorene Johnson, am serving as a scribe at 3:32 PM on 10/29/2018 to document services personally performed by Paradise Lugo MD based on my observations and the provider's statements to me.       Paradise Lugo MD  10/31/18 [id number]    "

## 2018-10-29 NOTE — ED AVS SNAPSHOT
[facility] Emergency Department    [address]    [facility] [address]    Phone:  [phone]    Fax:  [phone]                                       Dominguez Jordan   MRN: [id number]    Department:  [facility] Emergency Department   Date of Visit:  10/29/2018           Patient Information     Date Of Birth          1965        Your diagnoses for this visit were:     Cramp in lower leg     Encounter for postoperative wound check        You were seen by Paradise Lugo MD.      Follow-up Information     Follow up with [facility] In 4 days.    Why:  Arrange follow up appointment later this week    Contact information:    [address]  [address]  [facility] [address]          Follow up with [facility].    Why:  As needed    Contact information:    [address] [facility] [address] [phone]          Follow up with [facility] Emergency Department.    Specialty:  EMERGENCY MEDICINE    Why:  If symptoms worsen    Contact information:    [address]  [facility] [address] [phone]        Discharge Instructions       Continue elevating.  Continue oxycodone.  Monitor for color change like you noticed before.   Monitor for redness. The amount today is similar to picture when stitches were removed, but if redness extends beyond marking or if there is discharge, fever >100.4F, vomiting, or any other concerns, call [facility] or return to ER immediately.  Call to arrange an earlier appointment with [facility] for wound check and discuss symptoms in the next week or so.    Discharge References/Attachments     POST OP WOUND CHECK, GENERAL (ENGLISH)      Your next 10 appointments already scheduled     Nov 14, 2018  2:00 PM CST   Return Visit with Adriana Chacko PA-C   [facility] [place] ([facility])    [address]  [address]  [facility] [id number] [phone]              24 Hour Appointment Hotline       To make an appointment at any [facility], call [phone] ([phone]). If you don't have a family doctor or clinic, we will help you find one. [facility] clinics are conveniently located to serve the needs of you and your family.             Review of your medicines      Our records show that you are taking the medicines listed below. If these are incorrect, please call your family doctor or clinic.        Dose / Directions Last dose taken    docusate sodium 100 MG tablet   Commonly known as:  COLACE   Dose:  100 mg   Quantity:  60 tablet        Take 100 mg by mouth daily   Refills:  1        GLIPIZIDE PO        Refills:  0        hydrOXYzine 10 MG/5ML syrup   Commonly known as:  ATARAX   Dose:  10 mg        Take 10 mg by mouth 3 times daily   Refills:  0        hydrOXYzine 25 MG capsule   Commonly known as:  VISTARIL        Refills:  0        LISINOPRIL PO        Refills:  0        NORTRIPTYLINE HCL PO        Refills:  0        OXYCODONE HCL PO        Refills:  0        tamsulosin 0.4 MG capsule   Commonly known as:  FLOMAX   Dose:  0.4 mg   Quantity:  30 capsule        Take 1 capsule (0.4 mg) by mouth daily   Refills:  3                Procedures and tests performed during your visit     US Lower Extremity Venous Duplex Left      Orders Needing Specimen Collection     None      Pending Results     No orders found from 10/27/2018 to 10/30/2018.            Pending Culture Results     No orders found from 10/27/2018 to 10/30/2018.            Pending Results Instructions     If you had any lab results that were not finalized at the time of your Discharge, you can call the ED Lab Result RN at [phone]. You will be contacted by this team for any positive Lab results or changes in treatment. The nurses are available 7 days a week from 10A to 6:30P.  You can leave a message 24 hours per day and they will return your  call.        Test Results From Your Hospital Stay        10/29/2018  4:30 PM      Narrative     ULTRASOUND VENOUS LOWER EXTREMITY UNILATERAL LEFT  10/29/2018 4:19 PM     HISTORY: Swelling, pain, concern for deep venous thrombosis  postoperatively.     COMPARISON: None.    TECHNIQUE: Ultrasound gray scale, Color Doppler flow, and spectral  Doppler waveform analysis performed.    FINDINGS:  The left common femoral, superficial femoral, popliteal and  posterior tibial veins are patent and fully compressible and  demonstrate normal venous Doppler flow. The visualized greater  saphenous vein is negative for thrombus. For comparison the right  common femoral vein was evaluated and was unremarkable.        Impression     IMPRESSION: No DVT demonstrated.    HELEN REA MD                Clinical Quality Measure: Blood Pressure Screening     Your blood pressure was checked while you were in the emergency department today. The last reading we obtained was  BP: 114/83 . Please read the guidelines below about what these numbers mean and what you should do about them.  If your systolic blood pressure (the top number) is less than 120 and your diastolic blood pressure (the bottom number) is less than 80, then your blood pressure is normal. There is nothing more that you need to do about it.  If your systolic blood pressure (the top number) is 120-139 or your diastolic blood pressure (the bottom number) is 80-89, your blood pressure may be higher than it should be. You should have your blood pressure rechecked within a year by a primary care provider.  If your systolic blood pressure (the top number) is 140 or greater or your diastolic blood pressure (the bottom number) is 90 or greater, you may have high blood pressure. High blood pressure is treatable, but if left untreated over time it can put you at risk for heart attack, stroke, or kidney failure. You should have your blood pressure rechecked by a primary care provider  "within the next 4 weeks.  If your provider in the emergency department today gave you specific instructions to follow-up with your doctor or provider even sooner than that, you should follow that instruction and not wait for up to 4 weeks for your follow-up visit.        Thank you for choosing [facility]       Thank you for choosing [facility] for your care. Our goal is always to provide you with excellent care. Hearing back from our patients is one way we can continue to improve our services. Please take a few minutes to complete the written survey that you may receive in the mail after you visit with us. Thank you!        [email or web address] Information     [email or web address] lets you send messages to your doctor, view your test results, renew your prescriptions, schedule appointments and more. To sign up, go to [email or web address] . Click on \"Log in\" on the left side of the screen, which will take you to the Welcome page. Then click on \"Sign up Now\" on the right side of the page.     You will be asked to enter the access code listed below, as well as some personal information. Please follow the directions to create your username and password.     Your access code is: [email or web address]  Expires: 2019  5:03 PM     Your access code will  in 90 days. If you need help or a new code, please call your [facility] clinic or [phone].        Care EveryWhere ID     This is your Care EveryWhere ID. This could be used by other organizations to access your [facility] medical records  [id number]        Equal Access to Services     DMITRIY LOPEZ AH: Hadii eugene Sharpe, waaxda luqadaha, qaybta kaalmada lucio, ning lin . So [facility] [phone].    ATENCIÓN: Si habla español, tiene a castillo disposición servicios gratuitos de asistencia lingüística. Marixa al [phone].    We comply with applicable federal civil rights laws and [place] laws. We do not discriminate on the basis of race, color, national " origin, age, disability, sex, sexual orientation, or gender identity.            After Visit Summary       This is your record. Keep this with you and show to your community pharmacist(s) and doctor(s) at your next visit.

## 2018-10-29 NOTE — ED AVS SNAPSHOT
[facility] Emergency Department    [address]    [facility] [address]    Phone:  [phone]    Fax:  [phone]                                       Dominguez Jordan   MRN: [id number]    Department:  [facility] Emergency Department   Date of Visit:  10/29/2018           After Visit Summary Signature Page     I have received my discharge instructions, and my questions have been answered. I have discussed any challenges I see with this plan with the nurse or doctor.    ..........................................................................................................................................  Patient/Patient Representative Signature      ..........................................................................................................................................  Patient Representative Print Name and Relationship to Patient    ..................................................               ................................................  Date                                   Time    ..........................................................................................................................................  Reviewed by Signature/Title    ...................................................              ..............................................  Date                                               Time          22EPIC Rev 08/18

## 2018-10-29 NOTE — DISCHARGE INSTRUCTIONS
Continue elevating.  Continue oxycodone.  Monitor for color change like you noticed before.   Monitor for redness. The amount today is similar to picture when stitches were removed, but if redness extends beyond marking or if there is discharge, fever >100.4F, vomiting, or any other concerns, call [facility] or return to ER immediately.  Call to arrange an earlier appointment with [facility] for wound check and discuss symptoms in the next week or so.

## 2018-10-31 ASSESSMENT — ENCOUNTER SYMPTOMS
MYALGIAS: 1
WOUND: 1

## 2018-11-14 ENCOUNTER — OFFICE VISIT (OUTPATIENT)
Dept: UROLOGY | Facility: CLINIC | Age: 53
End: 2018-11-14
Payer: COMMERCIAL

## 2018-11-14 VITALS — WEIGHT: 190 LBS | OXYGEN SATURATION: 98 % | HEART RATE: 72 BPM | BODY MASS INDEX: 25.18 KG/M2 | HEIGHT: 73 IN

## 2018-11-14 DIAGNOSIS — R33.9 URINARY RETENTION: Primary | ICD-10-CM

## 2018-11-14 DIAGNOSIS — Z12.5 SCREENING FOR PROSTATE CANCER: ICD-10-CM

## 2018-11-14 LAB
ALBUMIN UR-MCNC: NEGATIVE MG/DL
APPEARANCE UR: CLEAR
BILIRUB UR QL STRIP: NEGATIVE
COLOR UR AUTO: YELLOW
GLUCOSE UR STRIP-MCNC: NEGATIVE MG/DL
HGB UR QL STRIP: NEGATIVE
KETONES UR STRIP-MCNC: NEGATIVE MG/DL
LEUKOCYTE ESTERASE UR QL STRIP: NEGATIVE
NITRATE UR QL: NEGATIVE
PH UR STRIP: 5.5 PH (ref 5–7)
RESIDUAL VOLUME (RV) (EXTERNAL): 30
SOURCE: NORMAL
SP GR UR STRIP: 1.02 (ref 1–1.03)
UROBILINOGEN UR STRIP-ACNC: 0.2 EU/DL (ref 0.2–1)

## 2018-11-14 PROCEDURE — 99213 OFFICE O/P EST LOW 20 MIN: CPT | Performed by: PHYSICIAN ASSISTANT

## 2018-11-14 PROCEDURE — 81003 URINALYSIS AUTO W/O SCOPE: CPT | Mod: QW | Performed by: PHYSICIAN ASSISTANT

## 2018-11-14 ASSESSMENT — PAIN SCALES - GENERAL: PAINLEVEL: EXTREME PAIN (8)

## 2018-11-14 NOTE — NURSING NOTE
Pt says he is better now than last time he was here.  Pt having foot problems.  PARDEEP Funes, SAMPSON

## 2018-11-14 NOTE — PROGRESS NOTES
"CC: Urinary retention.    HPI: It is a pleasure to see . Dominguez Jordan, a 53 year old male seen today in the urology clinic in follow up of urinary retention following left foot surgery. This developed acutely requiring Emerson catheter placement on 10/11/18. TOV passed 4 weeks ago.    The patient has no prior history of AUR or similar symptoms. At baseline, patient does note some hesitancy, frequency. Currently denies fevers, chills, N/V, abdominal/back pain.    No PSA on record.  Flomax seems to be helping his hesitancy and frequency. No SE noted.     Past Medical History:   Diagnosis Date     Diabetes (H)      Seizures (H)      TBI (traumatic brain injury) (H)      Past Surgical History:   Procedure Laterality Date     ORTHOPEDIC SURGERY Left 10/09/2018     Current Outpatient Prescriptions   Medication Sig Dispense Refill     docusate sodium (COLACE) 100 MG tablet Take 100 mg by mouth daily 60 tablet 1     GLIPIZIDE PO        LISINOPRIL PO        NORTRIPTYLINE HCL PO        OXYCODONE HCL PO        tamsulosin (FLOMAX) 0.4 MG capsule Take 1 capsule (0.4 mg) by mouth daily 30 capsule 3     hydrOXYzine (ATARAX) 10 MG/5ML syrup Take 10 mg by mouth 3 times daily       hydrOXYzine (VISTARIL) 25 MG capsule        Allergies   Allergen Reactions     Imitrex [Sumatriptan] Anaphylaxis     Depakote [Valproic Acid]      Liver problem     Family History: There is no h/o  malignancy.  There is no h/o urolithiasis.     Social History: The patient does not smoke cigarettes.  Denies EtOH and illicit drug use.      ROS: A comprehensive 14 point ROS was obtained and was  otherwise negative except for that outlined above in the HPI.    PHYSICAL EXAM:   Pulse 72  Ht 1.854 m (6' 1\")  Wt 86.2 kg (190 lb)  SpO2 98%  BMI 25.07 kg/m2  GEN: NAD  EYES: EOMI  MOUTH: MMM  NECK: Supple  RESP: Unlabored breathing  CARDIAC: No LE edema  SKIN: Warm  ABD: soft  NEURO: AAO  KOMAL: normal tone, no masses, (medium) prostate without nodules " or tenderness.    REVIEW OF OUTSIDE RECORDS: 5 minutes spent reviewing previous/outside records.    ASSESSMENT/PLAN:  53 year old male who developed acute urinary retention requiring Emerson catheter placement, resolved  -PSA and office visit in 4 wks  -Continue Flodilip Chacko PA-C  [facility]    20 min spent with the patient, >50% of this time was spent in a face-to-face manner and on coordination of care of urinary retention.

## 2018-11-14 NOTE — LETTER
11/14/2018       RE: Dominguez Jordan  [address] [facility] [address]     Dear Colleague,    Thank you for referring your patient, Dominguez Jordan, to the [facility] [place] at [facility]. Please see a copy of my visit note below.    CC: Urinary retention.    HPI: It is a pleasure to see Mr. Dominguez Jordan, a 53 year old male seen today in the urology clinic in follow up of urinary retention following left foot surgery. This developed acutely requiring Emerson catheter placement on 10/11/18. TOV passed 4 weeks ago.    The patient has no prior history of AUR or similar symptoms. At baseline, patient does note some hesitancy, frequency. Currently denies fevers, chills, N/V, abdominal/back pain.    No PSA on record.  Flomax seems to be helping his hesitancy and frequency. No SE noted.     Past Medical History:   Diagnosis Date     Diabetes (H)      Seizures (H)      TBI (traumatic brain injury) (H)      Past Surgical History:   Procedure Laterality Date     ORTHOPEDIC SURGERY Left 10/09/2018     Current Outpatient Prescriptions   Medication Sig Dispense Refill     docusate sodium (COLACE) 100 MG tablet Take 100 mg by mouth daily 60 tablet 1     GLIPIZIDE PO        LISINOPRIL PO        NORTRIPTYLINE HCL PO        OXYCODONE HCL PO        tamsulosin (FLOMAX) 0.4 MG capsule Take 1 capsule (0.4 mg) by mouth daily 30 capsule 3     hydrOXYzine (ATARAX) 10 MG/5ML syrup Take 10 mg by mouth 3 times daily       hydrOXYzine (VISTARIL) 25 MG capsule        Allergies   Allergen Reactions     Imitrex [Sumatriptan] Anaphylaxis     Depakote [Valproic Acid]      Liver problem     Family History: There is no h/o  malignancy.  There is no h/o urolithiasis.     Social History: The patient does not smoke cigarettes.  Denies EtOH and illicit drug use.      ROS: A comprehensive 14 point ROS was obtained and was  otherwise negative except for that  "outlined above in the HPI.    PHYSICAL EXAM:   Pulse 72  Ht 1.854 m (6' 1\")  Wt 86.2 kg (190 lb)  SpO2 98%  BMI 25.07 kg/m2  GEN: NAD  EYES: EOMI  MOUTH: MMM  NECK: Supple  RESP: Unlabored breathing  CARDIAC: No LE edema  SKIN: Warm  ABD: soft  NEURO: AAO  KOMAL: normal tone, no masses, (medium) prostate without nodules or tenderness.    REVIEW OF OUTSIDE RECORDS: 5 minutes spent reviewing previous/outside records.    ASSESSMENT/PLAN:  53 year old male who developed acute urinary retention requiring Emerson catheter placement, resolved  -PSA and office visit in 4 wks  -Continue Flomax    Adriana Chacko PA-C  [facility]    20 min spent with the patient, >50% of this time was spent in a face-to-face manner and on coordination of care of urinary retention.       "

## 2018-11-14 NOTE — PATIENT INSTRUCTIONS
See you in 4 weeks. Will have you go to the lab 1-2 days prior to seeing me for a blood draw (PSA screening test for the prostate).    Flomax to continue daily.

## 2018-11-14 NOTE — MR AVS SNAPSHOT
After Visit Summary   11/14/2018    Dominguez Jordan    MRN: [id number]           Patient Information     Date Of Birth          1965        Visit Information        Provider Department      11/14/2018 2:00 PM Adriana Chacko PA-C [facility]        Today's Diagnoses     Urinary retention    -  1    Screening for prostate cancer          Care Instructions    See you in 4 weeks. Will have you go to the lab 1-2 days prior to seeing me for a blood draw (PSA screening test for the prostate).    Flomax to continue daily.           Follow-ups after your visit        Follow-up notes from your care team     Return in about 4 weeks (around 12/12/2018).      Your next 10 appointments already scheduled     Dec 12, 2018 10:00 AM CST   LAB with [facility]   [facility]    [address]  [facility] [address]   [phone]           Please do not eat 10-12 hours before your appointment if you are coming in fasting for labs on lipids, cholesterol, or glucose (sugar). This does not apply to pregnant women. Water, hot tea and black coffee (with nothing added) are okay. Do not drink other fluids, diet soda or chew gum.            Dec 19, 2018  3:30 PM CST   Return Visit with Adriana Chacko PA-C   [facility] ([facility])    [address] [facility]  [address]  [facility] [id number] [phone]              Future tests that were ordered for you today     Open Future Orders        Priority Expected Expires Ordered    PSA tumor marker [[id number]] Routine  11/14/2019 11/14/2018            Who to contact     If you have questions or need follow up information about today's clinic visit or your schedule please contact [facility] directly at [phone].  Normal or non-critical lab and imaging results  "will be communicated to you by MyChart, letter or phone within 4 business days after the clinic has received the results. If you do not hear from us within 7 days, please contact the clinic through [employer] or phone. If you have a critical or abnormal lab result, we will notify you by phone as soon as possible.  Submit refill requests through [employer] or call your pharmacy and they will forward the refill request to us. Please allow 3 business days for your refill to be completed.          Additional Information About Your Visit        Opbeat Information     Opbeat lets you send messages to your doctor, view your test results, renew your prescriptions, schedule appointments and more. To sign up, go to [email or web address] . Click on \"Log in\" on the left side of the screen, which will take you to the Welcome page. Then click on \"Sign up Now\" on the right side of the page.     You will be asked to enter the access code listed below, as well as some personal information. Please follow the directions to create your username and password.     Your access code is: [email or web address]  Expires: 2019  4:03 PM     Your access code will  in 90 days. If you need help or a new code, please call your [facility] clinic or [phone].        Care EveryWhere ID     This is your Care EveryWhere ID. This could be used by other organizations to access your [facility] medical records  [id number]        Your Vitals Were     Pulse Height Pulse Oximetry BMI (Body Mass Index)          72 1.854 m (6' 1\") 98% 25.07 kg/m2         Blood Pressure from Last 3 Encounters:   10/29/18 114/83   10/15/18 132/88   10/11/18 105/82    Weight from Last 3 Encounters:   18 86.2 kg (190 lb)   10/29/18 86.2 kg (190 lb)   10/15/18 86.2 kg (190 lb)              We Performed the Following     Bladder scan     UA without Microscopic        Primary Care Provider Office Phone # Fax #    [facility] [phone] [facility] [address]        Equal Access to Services     DMITRIY LOPEZ : Hadii aad ku hadtheoyessica Sharpe, waarlethnorberto dooley, qadestinbridgett munizmaning pardo. So [facility] [phone].    ATENCIÓN: Si habla español, tiene a castillo disposición servicios gratuitos de asistencia lingüística. [facility] [phone].    We comply with applicable federal civil rights laws and [place] laws. We do not discriminate on the basis of race, color, national origin, age, disability, sex, sexual orientation, or gender identity.            Thank you!     Thank you for choosing [facility] [place]  for your care. Our goal is always to provide you with excellent care. Hearing back from our patients is one way we can continue to improve our services. Please take a few minutes to complete the written survey that you may receive in the mail after your visit with us. Thank you!             Your Updated Medication List - Protect others around you: Learn how to safely use, store and throw away your medicines at www.disposemymeds.org.          This list is accurate as of 11/14/18  2:38 PM.  Always use your most recent med list.                   Brand Name Dispense Instructions for use Diagnosis    docusate sodium 100 MG tablet    COLACE    60 tablet    Take 100 mg by mouth daily        GLIPIZIDE PO           hydrOXYzine 10 MG/5ML syrup    ATARAX     Take 10 mg by mouth 3 times daily        hydrOXYzine 25 MG capsule    VISTARIL          LISINOPRIL PO           NORTRIPTYLINE HCL PO           OXYCODONE HCL PO           tamsulosin 0.4 MG capsule    FLOMAX    30 capsule    Take 1 capsule (0.4 mg) by mouth daily    Urinary retention

## 2018-12-12 DIAGNOSIS — Z12.5 SCREENING FOR PROSTATE CANCER: ICD-10-CM

## 2018-12-12 PROCEDURE — 36415 COLL VENOUS BLD VENIPUNCTURE: CPT | Performed by: PHYSICIAN ASSISTANT

## 2018-12-12 PROCEDURE — G0103 PSA SCREENING: HCPCS | Performed by: PHYSICIAN ASSISTANT

## 2018-12-13 LAB — PSA SERPL-MCNC: 0.18 UG/L (ref 0–4)

## 2018-12-19 ENCOUNTER — OFFICE VISIT (OUTPATIENT)
Dept: UROLOGY | Facility: CLINIC | Age: 53
End: 2018-12-19
Payer: COMMERCIAL

## 2018-12-19 VITALS — OXYGEN SATURATION: 93 % | HEIGHT: 73 IN | WEIGHT: 190 LBS | HEART RATE: 80 BPM | BODY MASS INDEX: 25.18 KG/M2

## 2018-12-19 DIAGNOSIS — R39.12 BENIGN PROSTATIC HYPERPLASIA WITH WEAK URINARY STREAM: Primary | ICD-10-CM

## 2018-12-19 DIAGNOSIS — N40.1 BENIGN PROSTATIC HYPERPLASIA WITH WEAK URINARY STREAM: Primary | ICD-10-CM

## 2018-12-19 PROCEDURE — 99214 OFFICE O/P EST MOD 30 MIN: CPT | Performed by: PHYSICIAN ASSISTANT

## 2018-12-19 ASSESSMENT — PAIN SCALES - GENERAL: PAINLEVEL: NO PAIN (0)

## 2018-12-19 ASSESSMENT — MIFFLIN-ST. JEOR: SCORE: 1755.71

## 2018-12-19 NOTE — PATIENT INSTRUCTIONS
PSA blood test is normal!  Should be done once a year with a rectal exam of the prostate.     Can try stopping the Flomax (or tamsulosin) and see if your urinary symptoms change or worsen. You can always restart it.     Things to call me about: weak stream, dribbling, frequent trips to the toilet, can't pee, blood in the urine.     [phone]    Happy to see you in follow up in one year or you can see your primary care provider.

## 2018-12-19 NOTE — LETTER
12/19/2018       RE: Dominguez Jordan  [address] [facility] [address]     Dear Colleague,    Thank you for referring your patient, Dominguez Jordan, to the [facility] UROLOGY CLINIC [place] at [facility]. Please see a copy of my visit note below.    CC: Urinary retention.    HPI: It is a pleasure to see . Dominguez Jordan, a 53 year old male seen today in the urology clinic in follow up of urinary retention following left foot surgery (has hx of TBI). This developed acutely requiring Emerson catheter placement on 10/11/18. TOV passed 2 months ago.    The patient has no prior history of AUR or similar symptoms. At baseline, patient does note some hesitancy, frequency. Currently denies fevers, chills, N/V, abdominal/back pain.    No PSA on record, here for review of that; which is normal.  Flomax seems to be helping his hesitancy and frequency. No SE noted. Recent normal KOMAL.     Here with ex-wife.     Past Medical History:   Diagnosis Date     Diabetes (H)      Seizures (H)      TBI (traumatic brain injury) (H)      Past Surgical History:   Procedure Laterality Date     ORTHOPEDIC SURGERY Left 10/09/2018     Current Outpatient Medications   Medication Sig Dispense Refill     GLIPIZIDE PO        LISINOPRIL PO        NORTRIPTYLINE HCL PO        docusate sodium (COLACE) 100 MG tablet Take 100 mg by mouth daily (Patient not taking: Reported on 12/19/2018) 60 tablet 1     hydrOXYzine (ATARAX) 10 MG/5ML syrup Take 10 mg by mouth 3 times daily       hydrOXYzine (VISTARIL) 25 MG capsule        OXYCODONE HCL PO        tamsulosin (FLOMAX) 0.4 MG capsule Take 1 capsule (0.4 mg) by mouth daily (Patient not taking: Reported on 12/19/2018) 30 capsule 3     Allergies   Allergen Reactions     Imitrex [Sumatriptan] Anaphylaxis     Depakote [Valproic Acid]      Liver problem     Family History: There is no h/o  malignancy.  There is no h/o urolithiasis.  "    Social History: The patient does not smoke cigarettes.  Denies EtOH and illicit drug use.      PHYSICAL EXAM:   Pulse 80   Ht 1.854 m (6' 1\")   Wt 86.2 kg (190 lb)   SpO2 93%   BMI 25.07 kg/m     GEN: NAD  EYES: EOMI  MOUTH: MMM  NECK: Supple  RESP: Unlabored breathing  CARDIAC: No LE edema  SKIN: Warm  ABD: soft  NEURO: AAO    REVIEW OF OUTSIDE RECORDS: 5 minutes spent reviewing previous/outside records.    ASSESSMENT/PLAN:  53 year old male who developed acute urinary retention requiring Emerson catheter placement, resolved  -PSA and KOMAL annually  -Not sure he wants to continue with Flomax. Trial off of med and see if there is regression of urinary symptoms. Can always restart.     Adriana Chacko PA-C  [facility]    20 min spent with the patient, >50% of this time was spent in a face-to-face manner and on coordination of care of urinary retention.   "

## 2018-12-19 NOTE — PROGRESS NOTES
"CC: Urinary retention.    HPI: It is a pleasure to see . Dominguez Jordan, a 53 year old male seen today in the urology clinic in follow up of urinary retention following left foot surgery (has hx of TBI). This developed acutely requiring Emerson catheter placement on 10/11/18. TOV passed 2 months ago.    The patient has no prior history of AUR or similar symptoms. At baseline, patient does note some hesitancy, frequency. Currently denies fevers, chills, N/V, abdominal/back pain.    No PSA on record, here for review of that; which is normal.  Flomax seems to be helping his hesitancy and frequency. No SE noted. Recent normal KOMAL.     Here with ex-wife.     Past Medical History:   Diagnosis Date     Diabetes (H)      Seizures (H)      TBI (traumatic brain injury) (H)      Past Surgical History:   Procedure Laterality Date     ORTHOPEDIC SURGERY Left 10/09/2018     Current Outpatient Medications   Medication Sig Dispense Refill     GLIPIZIDE PO        LISINOPRIL PO        NORTRIPTYLINE HCL PO        docusate sodium (COLACE) 100 MG tablet Take 100 mg by mouth daily (Patient not taking: Reported on 12/19/2018) 60 tablet 1     hydrOXYzine (ATARAX) 10 MG/5ML syrup Take 10 mg by mouth 3 times daily       hydrOXYzine (VISTARIL) 25 MG capsule        OXYCODONE HCL PO        tamsulosin (FLOMAX) 0.4 MG capsule Take 1 capsule (0.4 mg) by mouth daily (Patient not taking: Reported on 12/19/2018) 30 capsule 3     Allergies   Allergen Reactions     Imitrex [Sumatriptan] Anaphylaxis     Depakote [Valproic Acid]      Liver problem     Family History: There is no h/o  malignancy.  There is no h/o urolithiasis.     Social History: The patient does not smoke cigarettes.  Denies EtOH and illicit drug use.      ROS: A comprehensive 14 point ROS was obtained and was  otherwise negative except for that outlined above in the HPI.    PHYSICAL EXAM:   Pulse 80   Ht 1.854 m (6' 1\")   Wt 86.2 kg (190 lb)   SpO2 93%   BMI 25.07 kg/m  "   GEN: NAD  EYES: EOMI  MOUTH: MMM  NECK: Supple  RESP: Unlabored breathing  CARDIAC: No LE edema  SKIN: Warm  ABD: soft  NEURO: AAO    REVIEW OF OUTSIDE RECORDS: 5 minutes spent reviewing previous/outside records.    ASSESSMENT/PLAN:  53 year old male who developed acute urinary retention requiring Emerson catheter placement, resolved  -PSA and KOMAL annually  -Not sure he wants to continue with Flomax. Trial off of med and see if there is regression of urinary symptoms. Can always restart.     Adriana Chacko PA-C  [facility]    20 min spent with the patient, >50% of this time was spent in a face-to-face manner and on coordination of care of urinary retention.

## 2020-01-14 DIAGNOSIS — N40.0 BPH (BENIGN PROSTATIC HYPERPLASIA): Primary | ICD-10-CM

## 2020-01-15 ENCOUNTER — OFFICE VISIT (OUTPATIENT)
Dept: UROLOGY | Facility: CLINIC | Age: 55
End: 2020-01-15
Payer: COMMERCIAL

## 2020-01-15 VITALS
SYSTOLIC BLOOD PRESSURE: 120 MMHG | WEIGHT: 200 LBS | BODY MASS INDEX: 26.51 KG/M2 | HEIGHT: 73 IN | DIASTOLIC BLOOD PRESSURE: 70 MMHG | HEART RATE: 80 BPM

## 2020-01-15 DIAGNOSIS — N40.0 BENIGN PROSTATIC HYPERPLASIA WITHOUT LOWER URINARY TRACT SYMPTOMS: ICD-10-CM

## 2020-01-15 LAB
ALBUMIN UR-MCNC: NEGATIVE MG/DL
APPEARANCE UR: CLEAR
BILIRUB UR QL STRIP: NEGATIVE
COLOR UR AUTO: YELLOW
GLUCOSE UR STRIP-MCNC: 250 MG/DL
HGB UR QL STRIP: NEGATIVE
KETONES UR STRIP-MCNC: NEGATIVE MG/DL
LEUKOCYTE ESTERASE UR QL STRIP: NEGATIVE
NITRATE UR QL: NEGATIVE
PH UR STRIP: 6 PH (ref 5–7)
RESIDUAL VOLUME (RV) (EXTERNAL): 27
SOURCE: ABNORMAL
SP GR UR STRIP: 1.02 (ref 1–1.03)
UROBILINOGEN UR STRIP-ACNC: 0.2 EU/DL (ref 0.2–1)

## 2020-01-15 PROCEDURE — 51798 US URINE CAPACITY MEASURE: CPT | Performed by: PHYSICIAN ASSISTANT

## 2020-01-15 PROCEDURE — 81003 URINALYSIS AUTO W/O SCOPE: CPT | Performed by: PHYSICIAN ASSISTANT

## 2020-01-15 PROCEDURE — 99213 OFFICE O/P EST LOW 20 MIN: CPT | Mod: 25 | Performed by: PHYSICIAN ASSISTANT

## 2020-01-15 ASSESSMENT — MIFFLIN-ST. JEOR: SCORE: 1796.07

## 2020-01-15 ASSESSMENT — PAIN SCALES - GENERAL: PAINLEVEL: MILD PAIN (3)

## 2020-01-15 NOTE — NURSING NOTE
Here for follow up urinary retention. He feels like he is voiding well and he is off the Flomax. PVR by bladder scan is 27 ml.Melly Carpio LPN

## 2020-01-15 NOTE — PROGRESS NOTES
"CC: Urinary retention.    HPI: It is a pleasure to see . Dominguez Jordan, a 54 year old male seen today in the urology clinic in follow up of urinary retention following left foot surgery (has hx of TBI). This developed acutely requiring Emerson catheter placement on 10/11/18.     The patient has no prior history of AUR or similar symptoms. Currently denies fevers, chills, N/V, abdominal/back pain.  Feels as if he is voiding fine without Flomax.     PSA 1/10/20 0.29.       Past Medical History:   Diagnosis Date     Diabetes (H)      Seizures (H)      TBI (traumatic brain injury) (H)      Past Surgical History:   Procedure Laterality Date     ORTHOPEDIC SURGERY Left 10/09/2018     Current Outpatient Medications   Medication Sig Dispense Refill     docusate sodium (COLACE) 100 MG tablet Take 100 mg by mouth daily (Patient not taking: Reported on 12/19/2018) 60 tablet 1     GLIPIZIDE PO        hydrOXYzine (ATARAX) 10 MG/5ML syrup Take 10 mg by mouth 3 times daily       hydrOXYzine (VISTARIL) 25 MG capsule        LISINOPRIL PO        NORTRIPTYLINE HCL PO        OXYCODONE HCL PO        tamsulosin (FLOMAX) 0.4 MG capsule Take 1 capsule (0.4 mg) by mouth daily (Patient not taking: Reported on 12/19/2018) 30 capsule 3     Allergies   Allergen Reactions     Imitrex [Sumatriptan] Anaphylaxis     Depakote [Valproic Acid]      Liver problem     Family History: There is no h/o  malignancy.  There is no h/o urolithiasis.     Social History: The patient does not smoke cigarettes.  Denies EtOH and illicit drug use.      ROS: A comprehensive 14 point ROS was obtained and was  otherwise negative except for that outlined above in the HPI.    PHYSICAL EXAM:   /70   Pulse 80   Ht 1.854 m (6' 1\")   Wt 90.7 kg (200 lb)   BMI 26.39 kg/m    GEN: NAD  EYES: EOMI  MOUTH: MMM  NECK: Supple  RESP: Unlabored breathing  CARDIAC: No LE edema  SKIN: Warm  ABD: soft  NEURO: AAO    ASSESSMENT/PLAN:  54 year old male who developed " acute urinary retention requiring Emerson catheter placement, resolved  -Return to PCP   -RTO AMY Chacko PA-C  [facility]    20 min spent with the patient, >50% of this time was spent in a face-to-face manner and on coordination of care of urinary retention.

## 2020-01-15 NOTE — LETTER
1/15/2020       RE: Dominguez Jordan  [address] [facility] [address]     Dear Colleague,    Thank you for referring your patient, Dominguez Jordan, to the [facility] UROLOGY CLINIC [place] at [facility]. Please see a copy of my visit note below.    CC: Urinary retention.    HPI: It is a pleasure to see . Dominguez Jordan, a 54 year old male seen today in the urology clinic in follow up of urinary retention following left foot surgery (has hx of TBI). This developed acutely requiring Emerson catheter placement on 10/11/18.     The patient has no prior history of AUR or similar symptoms. Currently denies fevers, chills, N/V, abdominal/back pain.  Feels as if he is voiding fine without Flomax.     PSA 1/10/20 0.29.       Past Medical History:   Diagnosis Date     Diabetes (H)      Seizures (H)      TBI (traumatic brain injury) (H)      Past Surgical History:   Procedure Laterality Date     ORTHOPEDIC SURGERY Left 10/09/2018     Current Outpatient Medications   Medication Sig Dispense Refill     docusate sodium (COLACE) 100 MG tablet Take 100 mg by mouth daily (Patient not taking: Reported on 12/19/2018) 60 tablet 1     GLIPIZIDE PO        hydrOXYzine (ATARAX) 10 MG/5ML syrup Take 10 mg by mouth 3 times daily       hydrOXYzine (VISTARIL) 25 MG capsule        LISINOPRIL PO        NORTRIPTYLINE HCL PO        OXYCODONE HCL PO        tamsulosin (FLOMAX) 0.4 MG capsule Take 1 capsule (0.4 mg) by mouth daily (Patient not taking: Reported on 12/19/2018) 30 capsule 3     Allergies   Allergen Reactions     Imitrex [Sumatriptan] Anaphylaxis     Depakote [Valproic Acid]      Liver problem     Family History: There is no h/o  malignancy.  There is no h/o urolithiasis.     Social History: The patient does not smoke cigarettes.  Denies EtOH and illicit drug use.      ROS: A comprehensive 14 point ROS was obtained and was  otherwise negative except for that  "outlined above in the HPI.    PHYSICAL EXAM:   /70   Pulse 80   Ht 1.854 m (6' 1\")   Wt 90.7 kg (200 lb)   BMI 26.39 kg/m     GEN: NAD  EYES: EOMI  MOUTH: MMM  NECK: Supple  RESP: Unlabored breathing  CARDIAC: No LE edema  SKIN: Warm  ABD: soft  NEURO: AAO    ASSESSMENT/PLAN:  54 year old male who developed acute urinary retention requiring Emerson catheter placement, resolved  -Return to PCP   -RTO PRN    Adriana Chacko PA-C  [facility]    20 min spent with the patient, >50% of this time was spent in a face-to-face manner and on coordination of care of urinary retention.             Again, thank you for allowing me to participate in the care of your patient.      Sincerely,    Adriana Chacko PA-C, [place]      "

## 2022-08-16 ENCOUNTER — OFFICE VISIT (OUTPATIENT)
Dept: UROLOGY | Facility: CLINIC | Age: 57
End: 2022-08-16
Payer: COMMERCIAL

## 2022-08-16 VITALS
DIASTOLIC BLOOD PRESSURE: 70 MMHG | HEIGHT: 73 IN | BODY MASS INDEX: 26.51 KG/M2 | WEIGHT: 200 LBS | SYSTOLIC BLOOD PRESSURE: 128 MMHG

## 2022-08-16 DIAGNOSIS — N40.1 BENIGN PROSTATIC HYPERPLASIA WITH URINARY HESITANCY: Primary | ICD-10-CM

## 2022-08-16 DIAGNOSIS — R39.11 BENIGN PROSTATIC HYPERPLASIA WITH URINARY HESITANCY: Primary | ICD-10-CM

## 2022-08-16 LAB
ALBUMIN UR-MCNC: NEGATIVE MG/DL
APPEARANCE UR: CLEAR
BILIRUB UR QL STRIP: NEGATIVE
COLOR UR AUTO: YELLOW
GLUCOSE UR STRIP-MCNC: NEGATIVE MG/DL
HGB UR QL STRIP: NEGATIVE
KETONES UR STRIP-MCNC: ABNORMAL MG/DL
LEUKOCYTE ESTERASE UR QL STRIP: NEGATIVE
NITRATE UR QL: NEGATIVE
PH UR STRIP: 5.5 [PH] (ref 5–7)
PSA SERPL-MCNC: 0.1 UG/L (ref 0–4)
RESIDUAL VOLUME (RV) (EXTERNAL): 20
SP GR UR STRIP: 1.02 (ref 1–1.03)
UROBILINOGEN UR STRIP-ACNC: 0.2 E.U./DL

## 2022-08-16 PROCEDURE — 81003 URINALYSIS AUTO W/O SCOPE: CPT | Mod: QW | Performed by: PHYSICIAN ASSISTANT

## 2022-08-16 PROCEDURE — 51798 US URINE CAPACITY MEASURE: CPT | Performed by: PHYSICIAN ASSISTANT

## 2022-08-16 PROCEDURE — 99213 OFFICE O/P EST LOW 20 MIN: CPT | Mod: 25 | Performed by: PHYSICIAN ASSISTANT

## 2022-08-16 PROCEDURE — 84153 ASSAY OF PSA TOTAL: CPT | Performed by: PHYSICIAN ASSISTANT

## 2022-08-16 PROCEDURE — 36415 COLL VENOUS BLD VENIPUNCTURE: CPT | Performed by: PHYSICIAN ASSISTANT

## 2022-08-16 RX ORDER — TAMSULOSIN HYDROCHLORIDE 0.4 MG/1
0.4 CAPSULE ORAL DAILY
Qty: 90 CAPSULE | Refills: 3 | Status: SHIPPED | OUTPATIENT
Start: 2022-08-16

## 2022-08-16 ASSESSMENT — PAIN SCALES - GENERAL: PAINLEVEL: EXTREME PAIN (8)

## 2022-08-16 NOTE — PATIENT INSTRUCTIONS
You have been prescribed Flomax (tamsulosin 0.4mg daily).    Flomax is prescribed for men with difficulty urinating due to benign prostatic enlargement.  Hopefully, you will see improvement in your urinary symptoms in the next 7-10 days.    Common side effects include:  Weakness or fatigue  Blurred vision  Sinus congestion or runny nose  Dizziness or lightheadedness  Decrease or lack of ejaculation  ____________________________________________________    PSA blood draw

## 2022-08-16 NOTE — PROGRESS NOTES
CC: Urinary retention.    HPI: It is a pleasure to see Mr. Dominguez Jordan, a very pleasant 57 year old male seen today in the urology clinic in follow up of urinary retention following left foot surgery (has hx of TBI). This developed acutely requiring Emerson catheter placement on 10/11/18. He was last seen 3 years ago. In the interim, he suffered a stroke in March 2022 with mild right hemiparesis.     The patient has no prior history of AUR or similar symptoms. Currently denies fevers, chills, N/V, abdominal/back pain.  Noting far more hesitancy and urgency. Stream is ok. Nocturia x1.   PSA 1/10/20 0.29.  Normal CT 7/1/22 for abdominal pain.     Here with mom, Annette.       Past Medical History:   Diagnosis Date     Diabetes (H)      Seizures (H)      TBI (traumatic brain injury) (H)      Past Surgical History:   Procedure Laterality Date     ORTHOPEDIC SURGERY Left 10/09/2018     Current Outpatient Medications   Medication Sig Dispense Refill     docusate sodium (COLACE) 100 MG tablet Take 100 mg by mouth daily (Patient not taking: Reported on 12/19/2018) 60 tablet 1     GLIPIZIDE PO        hydrOXYzine (ATARAX) 10 MG/5ML syrup Take 10 mg by mouth 3 times daily       hydrOXYzine (VISTARIL) 25 MG capsule        LISINOPRIL PO        NORTRIPTYLINE HCL PO        OXYCODONE HCL PO        tamsulosin (FLOMAX) 0.4 MG capsule Take 1 capsule (0.4 mg) by mouth daily (Patient not taking: Reported on 12/19/2018) 30 capsule 3     Allergies   Allergen Reactions     Imitrex [Sumatriptan] Anaphylaxis     Atorvastatin Other (See Comments)     Depakote [Valproic Acid]      Liver problem     Family History: There is no h/o  malignancy.  There is no h/o urolithiasis.     Social History: The patient does not smoke cigarettes.  Denies EtOH and illicit drug use.      IMAGING:    INDICATION:  Abdominal pain.    TECHNIQUE:  CT abdomen and pelvis acquired with 100 mL Omnipaque 350 IV contrast. Coronal and sagittal reformats were  "generated.    COMPARISON:  None.    FINDINGS:  Lower chest: Unremarkable.     Liver: Unremarkable.     Gallbladder and bile ducts: Unremarkable. No stones or inflammation. No biliary dilation.     Spleen: Unremarkable.     Pancreas: Unremarkable.     Adrenal glands: Unremarkable. No nodules.     Kidneys and Ureters: Unremarkable. No suspicious masses, stones, or hydronephrosis.     Lymph Nodes and Retroperitoneum: Unremarkable.    Vasculature: Unremarkable.    GI tract: Unremarkable. Normal in caliber. The appendix is not seen.    Peritoneum/Abdominal Wall: Unremarkable. No mass or infiltration. No free air or free fluid. Mild dieter mesentery.     Pelvic Viscera: Unremarkable. Small hydrocele.     Bladder: Unremarkable.    Bones: Unremarkable for age.         PHYSICAL EXAM:   /70   Ht 1.854 m (6' 1\")   Wt 90.7 kg (200 lb)   BMI 26.39 kg/m      GEN: NAD  EYES: EOMI  KOMAL: Deferred for PSA draw    ASSESSMENT/PLAN:  57 year old male who developed acute urinary retention requiring Emerson catheter placement, resolved; now with BPH w/ LUTS  -PSA today  -Flomax 0.4mg daily, SE reviewed.   -1 mo video review of progress. Consider cysto and poss UDS if not improved.     Adriana Chacko PA-C  [facility]    26 minutes spent on the date of the encounter doing chart review, review of test results, interpretation of tests, patient visit and documentation           "

## 2022-08-16 NOTE — LETTER
8/16/2022       RE: Dominguez Jordan  [address] [facility] [address]     Dear Colleague,    Thank you for referring your patient, Dominguez Jordan, to the [facility] UROLOGY CLINIC [place] at [facility]. Please see a copy of my visit note below.    CC: Urinary retention.    HPI: It is a pleasure to see . Dominguez Jordan, a very pleasant 57 year old male seen today in the urology clinic in follow up of urinary retention following left foot surgery (has hx of TBI). This developed acutely requiring Emerson catheter placement on 10/11/18. He was last seen 3 years ago. In the interim, he suffered a stroke in March 2022 with mild right hemiparesis.     The patient has no prior history of AUR or similar symptoms. Currently denies fevers, chills, N/V, abdominal/back pain.  Noting far more hesitancy and urgency. Stream is ok. Nocturia x1.   PSA 1/10/20 0.29.  Normal CT 7/1/22 for abdominal pain.     Here with mom, Annette.       Past Medical History:   Diagnosis Date     Diabetes (H)      Seizures (H)      TBI (traumatic brain injury) (H)      Past Surgical History:   Procedure Laterality Date     ORTHOPEDIC SURGERY Left 10/09/2018     Current Outpatient Medications   Medication Sig Dispense Refill     docusate sodium (COLACE) 100 MG tablet Take 100 mg by mouth daily (Patient not taking: Reported on 12/19/2018) 60 tablet 1     GLIPIZIDE PO        hydrOXYzine (ATARAX) 10 MG/5ML syrup Take 10 mg by mouth 3 times daily       hydrOXYzine (VISTARIL) 25 MG capsule        LISINOPRIL PO        NORTRIPTYLINE HCL PO        OXYCODONE HCL PO        tamsulosin (FLOMAX) 0.4 MG capsule Take 1 capsule (0.4 mg) by mouth daily (Patient not taking: Reported on 12/19/2018) 30 capsule 3     Allergies   Allergen Reactions     Imitrex [Sumatriptan] Anaphylaxis     Atorvastatin Other (See Comments)     Depakote [Valproic Acid]      Liver problem     Family History: There is no h/o  " malignancy.  There is no h/o urolithiasis.     Social History: The patient does not smoke cigarettes.  Denies EtOH and illicit drug use.      IMAGING:    INDICATION:  Abdominal pain.    TECHNIQUE:  CT abdomen and pelvis acquired with 100 mL Omnipaque 350 IV contrast. Coronal and sagittal reformats were generated.    COMPARISON:  None.    FINDINGS:  Lower chest: Unremarkable.     Liver: Unremarkable.     Gallbladder and bile ducts: Unremarkable. No stones or inflammation. No biliary dilation.     Spleen: Unremarkable.     Pancreas: Unremarkable.     Adrenal glands: Unremarkable. No nodules.     Kidneys and Ureters: Unremarkable. No suspicious masses, stones, or hydronephrosis.     Lymph Nodes and Retroperitoneum: Unremarkable.    Vasculature: Unremarkable.    GI tract: Unremarkable. Normal in caliber. The appendix is not seen.    Peritoneum/Abdominal Wall: Unremarkable. No mass or infiltration. No free air or free fluid. Mild dieter mesentery.     Pelvic Viscera: Unremarkable. Small hydrocele.     Bladder: Unremarkable.    Bones: Unremarkable for age.         PHYSICAL EXAM:   /70   Ht 1.854 m (6' 1\")   Wt 90.7 kg (200 lb)   BMI 26.39 kg/m      GEN: NAD  EYES: EOMI  KOMAL: Deferred for PSA draw    ASSESSMENT/PLAN:  57 year old male who developed acute urinary retention requiring Emerson catheter placement, resolved; now with BPH w/ LUTS  -PSA today  -Flomax 0.4mg daily, SE reviewed.   -1 mo video review of progress. Consider cysto and poss UDS if not improved.     [place] Gramajo [facility]    26 minutes spent on the date of the encounter doing chart review, review of test results, interpretation of tests, patient visit and documentation       "

## 2022-08-16 NOTE — NURSING NOTE
Chief Complaint   Patient presents with     BPH with LUTS     UA,PVR     PVR scan was 20ml today    Jacque Perez

## 2023-12-16 ENCOUNTER — HOSPITAL ENCOUNTER (EMERGENCY)
Facility: CLINIC | Age: 58
Discharge: HOME | End: 2023-12-16
Attending: STUDENT IN AN ORGANIZED HEALTH CARE EDUCATION/TRAINING PROGRAM | Admitting: STUDENT IN AN ORGANIZED HEALTH CARE EDUCATION/TRAINING PROGRAM
Payer: MEDICARE

## 2023-12-16 ENCOUNTER — APPOINTMENT (OUTPATIENT)
Dept: CT IMAGING | Facility: CLINIC | Age: 58
End: 2023-12-16
Attending: STUDENT IN AN ORGANIZED HEALTH CARE EDUCATION/TRAINING PROGRAM
Payer: MEDICARE

## 2023-12-16 ENCOUNTER — APPOINTMENT (OUTPATIENT)
Dept: MRI IMAGING | Facility: CLINIC | Age: 58
End: 2023-12-16
Attending: STUDENT IN AN ORGANIZED HEALTH CARE EDUCATION/TRAINING PROGRAM
Payer: MEDICARE

## 2023-12-16 VITALS
SYSTOLIC BLOOD PRESSURE: 167 MMHG | DIASTOLIC BLOOD PRESSURE: 101 MMHG | HEART RATE: 69 BPM | OXYGEN SATURATION: 97 % | TEMPERATURE: 98.4 F | RESPIRATION RATE: 20 BRPM

## 2023-12-16 DIAGNOSIS — G89.29 CHRONIC NONINTRACTABLE HEADACHE, UNSPECIFIED HEADACHE TYPE: ICD-10-CM

## 2023-12-16 DIAGNOSIS — I10 HYPERTENSION, UNSPECIFIED TYPE: ICD-10-CM

## 2023-12-16 DIAGNOSIS — Z86.73 HISTORY OF STROKE: ICD-10-CM

## 2023-12-16 DIAGNOSIS — R51.9 CHRONIC NONINTRACTABLE HEADACHE, UNSPECIFIED HEADACHE TYPE: ICD-10-CM

## 2023-12-16 LAB
ANION GAP SERPL CALCULATED.3IONS-SCNC: 9 MMOL/L (ref 7–15)
BASOPHILS # BLD AUTO: 0.1 10E3/UL (ref 0–0.2)
BASOPHILS NFR BLD AUTO: 1 %
BUN SERPL-MCNC: 14.5 MG/DL (ref 8–23)
CALCIUM SERPL-MCNC: 8.7 MG/DL (ref 8.6–10)
CHLORIDE SERPL-SCNC: 105 MMOL/L (ref 98–107)
CREAT BLD-MCNC: 1 MG/DL (ref 0.7–1.3)
CREAT SERPL-MCNC: 0.98 MG/DL (ref 0.67–1.17)
DEPRECATED HCO3 PLAS-SCNC: 23 MMOL/L (ref 22–29)
EGFRCR SERPLBLD CKD-EPI 2021: 89 ML/MIN/1.73M2
EGFRCR SERPLBLD CKD-EPI 2021: >60 ML/MIN/1.73M2
EOSINOPHIL # BLD AUTO: 0.2 10E3/UL (ref 0–0.7)
EOSINOPHIL NFR BLD AUTO: 4 %
ERYTHROCYTE [DISTWIDTH] IN BLOOD BY AUTOMATED COUNT: 14.6 % (ref 10–15)
GLUCOSE SERPL-MCNC: 147 MG/DL (ref 70–99)
HCT VFR BLD AUTO: 40 % (ref 40–53)
HGB BLD-MCNC: 13.3 G/DL (ref 13.3–17.7)
HOLD SPECIMEN: NORMAL
IMM GRANULOCYTES # BLD: 0 10E3/UL
IMM GRANULOCYTES NFR BLD: 1 %
LYMPHOCYTES # BLD AUTO: 3.1 10E3/UL (ref 0.8–5.3)
LYMPHOCYTES NFR BLD AUTO: 47 %
MCH RBC QN AUTO: 30.6 PG (ref 26.5–33)
MCHC RBC AUTO-ENTMCNC: 33.3 G/DL (ref 31.5–36.5)
MCV RBC AUTO: 92 FL (ref 78–100)
MONOCYTES # BLD AUTO: 0.9 10E3/UL (ref 0–1.3)
MONOCYTES NFR BLD AUTO: 15 %
NEUTROPHILS # BLD AUTO: 2 10E3/UL (ref 1.6–8.3)
NEUTROPHILS NFR BLD AUTO: 32 %
NRBC # BLD AUTO: 0 10E3/UL
NRBC BLD AUTO-RTO: 0 /100
PLATELET # BLD AUTO: 345 10E3/UL (ref 150–450)
POTASSIUM SERPL-SCNC: 4.3 MMOL/L (ref 3.4–5.3)
RBC # BLD AUTO: 4.35 10E6/UL (ref 4.4–5.9)
SODIUM SERPL-SCNC: 137 MMOL/L (ref 135–145)
WBC # BLD AUTO: 6.4 10E3/UL (ref 4–11)

## 2023-12-16 PROCEDURE — 36415 COLL VENOUS BLD VENIPUNCTURE: CPT | Performed by: STUDENT IN AN ORGANIZED HEALTH CARE EDUCATION/TRAINING PROGRAM

## 2023-12-16 PROCEDURE — A9585 GADOBUTROL INJECTION: HCPCS | Performed by: STUDENT IN AN ORGANIZED HEALTH CARE EDUCATION/TRAINING PROGRAM

## 2023-12-16 PROCEDURE — 70498 CT ANGIOGRAPHY NECK: CPT | Mod: MA

## 2023-12-16 PROCEDURE — 250N000011 HC RX IP 250 OP 636: Performed by: STUDENT IN AN ORGANIZED HEALTH CARE EDUCATION/TRAINING PROGRAM

## 2023-12-16 PROCEDURE — 258N000003 HC RX IP 258 OP 636: Performed by: STUDENT IN AN ORGANIZED HEALTH CARE EDUCATION/TRAINING PROGRAM

## 2023-12-16 PROCEDURE — 70553 MRI BRAIN STEM W/O & W/DYE: CPT | Mod: MA

## 2023-12-16 PROCEDURE — 70450 CT HEAD/BRAIN W/O DYE: CPT | Mod: MA,XU

## 2023-12-16 PROCEDURE — 80048 BASIC METABOLIC PNL TOTAL CA: CPT | Performed by: STUDENT IN AN ORGANIZED HEALTH CARE EDUCATION/TRAINING PROGRAM

## 2023-12-16 PROCEDURE — 96361 HYDRATE IV INFUSION ADD-ON: CPT

## 2023-12-16 PROCEDURE — 99285 EMERGENCY DEPT VISIT HI MDM: CPT | Mod: 25

## 2023-12-16 PROCEDURE — 96374 THER/PROPH/DIAG INJ IV PUSH: CPT | Mod: 59

## 2023-12-16 PROCEDURE — 70496 CT ANGIOGRAPHY HEAD: CPT | Mod: MA

## 2023-12-16 PROCEDURE — 85025 COMPLETE CBC W/AUTO DIFF WBC: CPT | Performed by: STUDENT IN AN ORGANIZED HEALTH CARE EDUCATION/TRAINING PROGRAM

## 2023-12-16 PROCEDURE — 93005 ELECTROCARDIOGRAM TRACING: CPT

## 2023-12-16 PROCEDURE — 96375 TX/PRO/DX INJ NEW DRUG ADDON: CPT | Mod: 59

## 2023-12-16 PROCEDURE — 82565 ASSAY OF CREATININE: CPT | Mod: 91

## 2023-12-16 PROCEDURE — 250N000009 HC RX 250: Performed by: STUDENT IN AN ORGANIZED HEALTH CARE EDUCATION/TRAINING PROGRAM

## 2023-12-16 PROCEDURE — 255N000002 HC RX 255 OP 636: Performed by: STUDENT IN AN ORGANIZED HEALTH CARE EDUCATION/TRAINING PROGRAM

## 2023-12-16 RX ORDER — ATORVASTATIN CALCIUM 40 MG/1
40 TABLET, FILM COATED ORAL
COMMUNITY
Start: 2022-03-18

## 2023-12-16 RX ORDER — DIPHENHYDRAMINE HYDROCHLORIDE 50 MG/ML
25 INJECTION INTRAMUSCULAR; INTRAVENOUS ONCE
Status: COMPLETED | OUTPATIENT
Start: 2023-12-16 | End: 2023-12-16

## 2023-12-16 RX ORDER — METOCLOPRAMIDE HYDROCHLORIDE 5 MG/ML
5 INJECTION INTRAMUSCULAR; INTRAVENOUS ONCE
Status: COMPLETED | OUTPATIENT
Start: 2023-12-16 | End: 2023-12-16

## 2023-12-16 RX ORDER — IOPAMIDOL 755 MG/ML
500 INJECTION, SOLUTION INTRAVASCULAR ONCE
Status: COMPLETED | OUTPATIENT
Start: 2023-12-16 | End: 2023-12-16

## 2023-12-16 RX ORDER — AMLODIPINE BESYLATE 5 MG/1
5 TABLET ORAL DAILY
Qty: 30 TABLET | Refills: 0 | Status: SHIPPED | OUTPATIENT
Start: 2023-12-16 | End: 2023-12-17

## 2023-12-16 RX ORDER — GADOBUTROL 604.72 MG/ML
9 INJECTION INTRAVENOUS ONCE
Status: COMPLETED | OUTPATIENT
Start: 2023-12-16 | End: 2023-12-16

## 2023-12-16 RX ADMIN — DIPHENHYDRAMINE HYDROCHLORIDE 25 MG: 50 INJECTION, SOLUTION INTRAMUSCULAR; INTRAVENOUS at 14:16

## 2023-12-16 RX ADMIN — SODIUM CHLORIDE 80 ML: 9 INJECTION, SOLUTION INTRAVENOUS at 13:49

## 2023-12-16 RX ADMIN — GADOBUTROL 9 ML: 604.72 INJECTION INTRAVENOUS at 17:24

## 2023-12-16 RX ADMIN — SODIUM CHLORIDE 1000 ML: 9 INJECTION, SOLUTION INTRAVENOUS at 13:30

## 2023-12-16 RX ADMIN — METOCLOPRAMIDE HYDROCHLORIDE 5 MG: 5 INJECTION INTRAMUSCULAR; INTRAVENOUS at 14:17

## 2023-12-16 RX ADMIN — IOPAMIDOL 67 ML: 755 INJECTION, SOLUTION INTRAVENOUS at 13:49

## 2023-12-16 ASSESSMENT — ACTIVITIES OF DAILY LIVING (ADL)
ADLS_ACUITY_SCORE: 35

## 2023-12-16 NOTE — CONSULTS
[facility]    Stroke Telephone Note    I was called by Ame Degroot on 12/16/23 regarding patient Dominguez Jordan. The patient is a 58 year old male with history of HTN, DM, prior strokes 2/2 small vessel disease (not compliant with medications) with residual balance disturbances presented with severe headache since last night. No new focal deficits on exam.     Vitals  BP: (!) 162/107   Pulse: 62   Resp: 18   Temp: 98.4  F (36.9  C)        Imaging Findings  HEAD CT:  1.  No acute intracranial hemorrhage, extra-axial collection, or midline shift.  2.  Age-indeterminate infarct extending from the right caudate to right lentiform nucleus.  3.  Age-indeterminate left thalamic lacunar infarct.  4.  Chronic changes as above.     HEAD CTA:   1.  Nonvisualized right V4 segment. Could be due to congenital termination at the level of the dura, chronic, severe stenosis of the level of the dura or age-indeterminate occlusion.  2.  Severe stenosis distal left M1 segment.  3.  Moderate stenosis proximal left M1 segment.  4.  Moderate stenosis proximal right A2 segment.  5.  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     NECK CTA:  1.  Dominant left and smaller right vertebral arteries. There is decrease in caliber of the right V4 segment which terminates at the level of the dura. Unsure if this is due to congenital termination, severe stenosis, or occlusion.  2.  No hemodynamically significant stenosis in the internal carotid arteries or left vertebral artery.    MRI BRAIN WWO CONTRAST  1.  No acute intracranial abnormality. Specifically, no acute infarct.  2.  Multiple remote infarcts as detailed above, superimposed on a background of mild chronic microvascular ischemic disease.  3.  No pathologic intracranial contrast enhancement.    Impression  Chronic migraines    Recommendations  Recommend symptomatic management  Continue home Aspirin and  "Lipitor    My recommendations are based on the information provided over the phone by Dominguez Jordna's in-person providers. They are not intended to replace the clinical judgment of his in-person providers. I was not requested to personally see or examine the patient at this time.     Heri Maldonado MD       Department of Neurology       Securely message with the Vocera Web Console (learn more here)   To page me or covering stroke neurology team member, click here: AMCOM  Choose \"On Call\" tab at top, then select \"NEUROLOGY/ALL SITES\" from middle drop-down box, press Enter, then look for \"stroke\" or \"telestroke\" for your site.        "

## 2023-12-16 NOTE — ED NOTES
Neuro CognitiveCognitive/Neuro/Behavioral WDL: all (pt comes in with headache and being able to hear his heart beat in his right ear. pt has a hx of a TBI when he was 17 and hx of a stroke earlier this year.)Level of Consciousness: alert (pt reports unbalenced today and states he usually has issues with balence when he has a headache. recently stopped atenolol due to severe dizziness.)Arousal Level: opens eyes spontaneouslyOrientation: oriented x 4Speech: clear; spontaneousMood/Behavior: calm; cooperative  [place] Coma ScaleBest Eye Response: 4-->(E4) spontaneousBest Motor Response: 6-->(M6) obeys commandsBest Verbal Response: 5-->(V5) orientedGlasgow Coma Scale Score: 15  Hand /Ankle StrengthHand , Left: strongHand , Right: strongDorsiflexion, Left: strongDorsiflexion, Right: strongPlantarflexion, Left: strongPlantarflexion, Right: strong

## 2023-12-16 NOTE — ED PROVIDER NOTES
Assumed care of pt at approx 1700, CC Of HA. Hx of chronic headache and prior TBI. CT/CTA negative, pending MR at this time.      6:55PM  Spoke with stroke neuro Dr Penaloza as MR without acute infarcts. From stroke neuro perspective, able to go home.     Spoke to patient and updated him and partner on results. Given return precautions. Discussed that Dr Vega has prescribed him antihypertensive to start taking. He will follow up with his PCP outpatient. Discharged in stable condition.     MD Otis Rosado Connie, MD  12/18/23 [id number]

## 2023-12-16 NOTE — ED PROVIDER NOTES
"  History     Chief Complaint:  Hypertension       HPI   Dominguez Jordan is a 58 year old male with PMH stroke, hypertension,  diabetes, TBI with chronic residual migraines who presents with migraine that started gradually last night.  Feels similar in nature to prior migraines that have been this way for the last 40 years of his life.  He does note that during severe migraines he feels a ringing in his right ear and being able to hear his heart beating.  He did not take anything for the pain as he states he is \"used to it\".  He has no new numbness, weakness, tingling.  Family at bedside says that he has probably not been taking his 325 aspirin although did take them this morning when they found out.  He denies chest pain, shortness of breath, fevers, chills, sore throat, double or blurry vision.  Has right foot drop at baseline.    Family is wondering whether stress is playing a role in his headache as he recently found out that he requires ongoing physical therapy prior to moving back to his [place].  This is related to ongoing core strength weakness in the setting of being physically deconditioned over the last year in recovery from his stroke.      Independent Historian:   Friend - They report ex-wife at bedside assisted in historical information    Review of External Notes:   [facility] discharge summary 9/2022.  Patient was hospitalized for acute CVA.  Started on Plavix and aspirin ultimately transition to 3-5 aspirin daily.      Medications:    docusate sodium (COLACE) 100 MG tablet  GLIPIZIDE PO  hydrOXYzine (ATARAX) 10 MG/5ML syrup  hydrOXYzine (VISTARIL) 25 MG capsule  LISINOPRIL PO  NORTRIPTYLINE HCL PO  OXYCODONE HCL PO  tamsulosin (FLOMAX) 0.4 MG capsule  tamsulosin (FLOMAX) 0.4 MG capsule        Past Medical History:    Past Medical History:   Diagnosis Date    Diabetes (H)     Seizures (H)     TBI (traumatic brain injury) (H)        Past Surgical History:    Past Surgical History:   Procedure Laterality " Date    ORTHOPEDIC SURGERY Left 10/09/2018        Physical Exam   Patient Vitals for the past 24 hrs:   BP Temp Temp src Pulse Resp SpO2   12/16/23 1530 (!) 177/102 -- -- 56 -- 99 %   12/16/23 1330 (!) 162/107 -- -- 62 -- 100 %   12/16/23 1315 (!) 160/107 -- -- 67 -- 95 %   12/16/23 1259 (!) 167/116 -- -- 63 -- 98 %   12/16/23 1245 (!) 183/102 -- -- 60 -- 99 %   12/16/23 1215 (!) 160/97 -- -- 70 -- 97 %   12/16/23 1200 (!) 143/98 -- -- 65 -- 96 %   12/16/23 1145 (!) 151/104 -- -- 69 -- 98 %   12/16/23 1130 (!) 153/105 -- -- 69 -- 97 %   12/16/23 1125 -- 98.4  F (36.9  C) Oral -- -- --   12/16/23 1121 (!) 149/95 -- -- 66 -- --   12/16/23 1104 (!) 180/116 98.1  F (36.7  C) Oral 72 18 97 %        Physical Exam  General: Awake, alert, in no acute distress   HEENT: Atraumatic   EOM normal   External ears normal   Trachea midline  Neck: Supple, normal ROM  CV: Regular rate, regular rhythm   No murmur   No lower extremity edema  2+ radial and DP pulses  PULM: Breath sounds normal bilaterally  No wheezes or rales  ABD: Soft, non-tender, non-distended  Normal bowel sounds   No rebound or guarding   MSK: No gross deformities  NEURO: Alert, no focal deficits. 5/5 strength in bilateral upper and lower extremities except for right foot drop.  No gross sensory deficits.  Patient moves in a coordinated fashion.  Cranial nerves 2-12 intact except for mild strabismus.  Cerebellar testing intact    Skin: Warm, dry and intact      Emergency Department Course   ECG  ECG taken at 1119, ECG read at 1122  Normal sinus.  T wave inversions in V1 and V2.  No ST elevations or depressions.  Normal intervals.   Cannot see images from prior EKG 3/19/2022.  Reads as normal sinus rhythm.  Axis similar    ECG results from 12/16/23   EKG 12 lead     Value    Systolic Blood Pressure     Diastolic Blood Pressure     Ventricular Rate 68    Atrial Rate 68    OH Interval 178    QRS Duration 104        QTc 448    P Axis 48    R AXIS 44    T Axis 58     Interpretation ECG      Sinus rhythm  Low voltage QRS  Nonspecific T wave abnormality  Abnormal ECG  No previous ECGs available           Imaging:  CTA Head Neck with Contrast   Final Result   IMPRESSION:    HEAD CT:   1.  No acute intracranial hemorrhage, extra-axial collection, or midline shift.   2.  Age-indeterminate infarct extending from the right caudate to right lentiform nucleus.   3.  Age-indeterminate left thalamic lacunar infarct.   4.  Chronic changes as above.      HEAD CTA:    1.  Nonvisualized right V4 segment. Could be due to congenital termination at the level of the dura, chronic, severe stenosis of the level of the dura or age-indeterminate occlusion.   2.  Severe stenosis distal left M1 segment.   3.  Moderate stenosis proximal left M1 segment.   4.  Moderate stenosis proximal right A2 segment.   5.  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      NECK CTA:   1.  Dominant left and smaller right vertebral arteries. There is decrease in caliber of the right V4 segment which terminates at the level of the dura. Unsure if this is due to congenital termination, severe stenosis, or occlusion.   2.  No hemodynamically significant stenosis in the internal carotid arteries or left vertebral artery.      Head CT w/o contrast   Final Result   IMPRESSION:    HEAD CT:   1.  No acute intracranial hemorrhage, extra-axial collection, or midline shift.   2.  Age-indeterminate infarct extending from the right caudate to right lentiform nucleus.   3.  Age-indeterminate left thalamic lacunar infarct.   4.  Chronic changes as above.      HEAD CTA:    1.  Nonvisualized right V4 segment. Could be due to congenital termination at the level of the dura, chronic, severe stenosis of the level of the dura or age-indeterminate occlusion.   2.  Severe stenosis distal left M1 segment.   3.  Moderate stenosis proximal left M1 segment.   4.  Moderate stenosis proximal right A2  segment.   5.  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      NECK CTA:   1.  Dominant left and smaller right vertebral arteries. There is decrease in caliber of the right V4 segment which terminates at the level of the dura. Unsure if this is due to congenital termination, severe stenosis, or occlusion.   2.  No hemodynamically significant stenosis in the internal carotid arteries or left vertebral artery.      MR Brain w/o & w Contrast    (Results Pending)          Laboratory:  Labs Ordered and Resulted from Time of ED Arrival to Time of ED Departure   BASIC METABOLIC PANEL - Abnormal       Result Value    Sodium 137      Potassium 4.3      Chloride 105      Carbon Dioxide (CO2) 23      Anion Gap 9      Urea Nitrogen 14.5      Creatinine 0.98      GFR Estimate 89      Calcium 8.7      Glucose 147 (*)    CBC WITH PLATELETS AND DIFFERENTIAL - Abnormal    WBC Count 6.4      RBC Count 4.35 (*)     Hemoglobin 13.3      Hematocrit 40.0      MCV 92      MCH 30.6      MCHC 33.3      RDW 14.6      Platelet Count 345      % Neutrophils 32      % Lymphocytes 47      % Monocytes 15      % Eosinophils 4      % Basophils 1      % Immature Granulocytes 1      NRBCs per 100 WBC 0      Absolute Neutrophils 2.0      Absolute Lymphocytes 3.1      Absolute Monocytes 0.9      Absolute Eosinophils 0.2      Absolute Basophils 0.1      Absolute Immature Granulocytes 0.0      Absolute NRBCs 0.0     ISTAT CREATININE POCT - Normal    Creatinine POCT 1.0      GFR, ESTIMATED POCT >60     ISTAT CREATININE POCT        Procedures   None    Emergency Department Course & Assessments:             Interventions:  Medications   metoclopramide (REGLAN) injection 5 mg (5 mg Intravenous $Given 12/16/23 1417)   sodium chloride 0.9% BOLUS 1,000 mL (1,000 mLs Intravenous $New Bag 12/16/23 1330)   diphenhydrAMINE (BENADRYL) injection 25 mg (25 mg Intravenous $Given 12/16/23 1416)   iopamidol (ISOVUE-370)  solution 500 mL (67 mLs Intravenous $Given 12/16/23 1349)   CT scan flush (80 mLs Intravenous $Given 12/16/23 1349)        Assessments:  See ED course     Independent Interpretation (X-rays, CTs, rhythm strip):  CT HEAD: Areas of gliosis in the left frontal and parietal brain.  No acute hemorrhage noted.    Consultations/Discussion of Management or Tests:     ED Course as of 12/16/23 1645   Sat Dec 16, 2023   1449 New MCA stenosis and right A2 stenosis when compared to MRI angio 9/22 through [facility] system report   1502 Spoke with stroke neurology.  Due to progression of stenosis, recommendation for repeat MRI which is ordered.  Will touch base with stroke neurology after results.   1533 Reevaluation.  Headache improved.  No new neurologic deficits.  Discussed results and plan       Social Determinants of Health affecting care:   None    Disposition:  Care of the patient was transferred to my colleague Dr. Berg pending MRI.     Impression & Plan    CMS Diagnoses: None    Medical Decision Making:  Vitals hypertensive on arrival 180s over 100s otherwise WNL.  This is a patient with history of strokes.  He has received most of his stroke care through [facility].  Family is concerned has been noncompliant with his 325 aspirin although he did take it this morning.  He describes headache that is not any worse than his prior baseline although blood pressure was more elevated than recent priors.  His neurologist discontinued atenolol due to increasing dizziness.  Declining any pain medication for headache.  His neuroexam is unchanged    Given his long history of strokes, sent for CT and CT angiography which show worsening areas of stenosis in the left M1 and right A2.  Otherwise when I compare the report of his recent MRI from September 2022 to our CT head, all of the areas of stroke appear to be reported as the same.  Despite this, he had no M1 or A2 stenosis on MRA of the head last year.    Due to worsening areas of intracranial  stenosis, spoke with stroke neurology who recommended MRI.  This is pending.  His blood pressure remains elevated although will allow permissive hypertension around 180 SBP in case he did have a small stroke in the last 1 day.    Plan is to reconsult stroke neurology after MRI for further recommendations.  Likely if acute stroke will admit to hospital medicine with neurology consultation.  If no acute stroke, suspect could discharge with close outpatient follow-up for his longstanding hypertension.  Will write for starting 5 mg amlodipine daily if he is able to discharge to do uncontrolled hypertension.  Did not tolerate beta-blockers previously.    Signed out to my colleague Dr. Berg pending MRI.    Diagnosis:    ICD-10-CM    1. Chronic nonintractable headache, unspecified headache type  R51.9     G89.29       2. Hypertension, unspecified type  I10       3. History of stroke  Z86.73                   Ame Degroot,   12/16/2023   Ame Henson, DO        Ame Henson, DO  12/16/23 1643       Ame Henson,   12/16/23 1645       Ame Henson, DO  12/16/23 1652

## 2023-12-16 NOTE — ED TRIAGE NOTES
Patient has a hx of TBI with chronic headaches, today patient has noticed a pounding in his ears and ringing which prompted him to take his blood pressure  which was elevated at home.      Triage Assessment (Adult)       Row Name 12/16/23 1100          Triage Assessment    Airway WDL WDL        Respiratory WDL    Respiratory WDL WDL        Skin Circulation/Temperature WDL    Skin Circulation/Temperature WDL WDL        Cardiac WDL    Cardiac WDL WDL        Peripheral/Neurovascular WDL    Peripheral Neurovascular WDL WDL        Cognitive/Neuro/Behavioral WDL    Cognitive/Neuro/Behavioral WDL WDL

## 2023-12-17 RX ORDER — AMLODIPINE BESYLATE 5 MG/1
5 TABLET ORAL DAILY
Qty: 30 TABLET | Refills: 0 | Status: SHIPPED | OUTPATIENT
Start: 2023-12-17

## 2023-12-17 NOTE — CONSULTS
Brief note:    -MRI Brain was reviewed and no new infarcts noted.   -No further stroke work up recommended.    Discussed with Dr. Maldonado.    Anna Penaloza MD  Vascular Neurology fellow

## 2023-12-17 NOTE — DISCHARGE INSTRUCTIONS
You were seen in the emergency department for headache.  CT scans and the MRIs did not show any new stroke.  Please follow-up with your primary care provider.    If you start to have new severe headache, if you start to have any new slurred speech, numbness or tingling in 1 part of your body or weakness in one part of your body that is new, inability to walk, or other concerning symptoms please come back to the emergency room.

## 2023-12-18 LAB
ATRIAL RATE - MUSE: 68 BPM
DIASTOLIC BLOOD PRESSURE - MUSE: NORMAL MMHG
INTERPRETATION ECG - MUSE: NORMAL
P AXIS - MUSE: 48 DEGREES
PR INTERVAL - MUSE: 178 MS
QRS DURATION - MUSE: 104 MS
QT - MUSE: 422 MS
QTC - MUSE: 448 MS
R AXIS - MUSE: 44 DEGREES
SYSTOLIC BLOOD PRESSURE - MUSE: NORMAL MMHG
T AXIS - MUSE: 58 DEGREES
VENTRICULAR RATE- MUSE: 68 BPM

## 2024-03-18 ENCOUNTER — APPOINTMENT (OUTPATIENT)
Dept: CT IMAGING | Facility: CLINIC | Age: 59
End: 2024-03-18
Attending: STUDENT IN AN ORGANIZED HEALTH CARE EDUCATION/TRAINING PROGRAM
Payer: MEDICARE

## 2024-03-18 ENCOUNTER — HOSPITAL ENCOUNTER (EMERGENCY)
Facility: CLINIC | Age: 59
Discharge: HOME | End: 2024-03-18
Attending: EMERGENCY MEDICINE | Admitting: EMERGENCY MEDICINE
Payer: MEDICARE

## 2024-03-18 VITALS
SYSTOLIC BLOOD PRESSURE: 123 MMHG | HEART RATE: 70 BPM | RESPIRATION RATE: 16 BRPM | HEIGHT: 71 IN | BODY MASS INDEX: 28.98 KG/M2 | OXYGEN SATURATION: 97 % | TEMPERATURE: 97.2 F | WEIGHT: 207 LBS | DIASTOLIC BLOOD PRESSURE: 84 MMHG

## 2024-03-18 DIAGNOSIS — R51.9 CHRONIC DAILY HEADACHE: ICD-10-CM

## 2024-03-18 DIAGNOSIS — Z87.820 HISTORY OF TRAUMATIC BRAIN INJURY: ICD-10-CM

## 2024-03-18 LAB
ANION GAP SERPL CALCULATED.3IONS-SCNC: 10 MMOL/L (ref 7–15)
APTT PPP: 34 SECONDS (ref 22–38)
BASOPHILS # BLD AUTO: 0.1 10E3/UL (ref 0–0.2)
BASOPHILS NFR BLD AUTO: 1 %
BUN SERPL-MCNC: 15 MG/DL (ref 8–23)
CALCIUM SERPL-MCNC: 8.2 MG/DL (ref 8.8–10.2)
CHLORIDE SERPL-SCNC: 100 MMOL/L (ref 98–107)
CREAT SERPL-MCNC: 1.07 MG/DL (ref 0.67–1.17)
DEPRECATED HCO3 PLAS-SCNC: 22 MMOL/L (ref 22–29)
EGFRCR SERPLBLD CKD-EPI 2021: 79 ML/MIN/1.73M2
EOSINOPHIL # BLD AUTO: 0.1 10E3/UL (ref 0–0.7)
EOSINOPHIL NFR BLD AUTO: 1 %
ERYTHROCYTE [DISTWIDTH] IN BLOOD BY AUTOMATED COUNT: 14.3 % (ref 10–15)
GLUCOSE SERPL-MCNC: 93 MG/DL (ref 70–99)
HCT VFR BLD AUTO: 38.7 % (ref 40–53)
HGB BLD-MCNC: 12.8 G/DL (ref 13.3–17.7)
IMM GRANULOCYTES # BLD: 0 10E3/UL
IMM GRANULOCYTES NFR BLD: 0 %
INR PPP: 1.14 (ref 0.85–1.15)
LYMPHOCYTES # BLD AUTO: 1.5 10E3/UL (ref 0.8–5.3)
LYMPHOCYTES NFR BLD AUTO: 23 %
MCH RBC QN AUTO: 30.5 PG (ref 26.5–33)
MCHC RBC AUTO-ENTMCNC: 33.1 G/DL (ref 31.5–36.5)
MCV RBC AUTO: 92 FL (ref 78–100)
MONOCYTES # BLD AUTO: 0.7 10E3/UL (ref 0–1.3)
MONOCYTES NFR BLD AUTO: 10 %
NEUTROPHILS # BLD AUTO: 4.1 10E3/UL (ref 1.6–8.3)
NEUTROPHILS NFR BLD AUTO: 65 %
NRBC # BLD AUTO: 0 10E3/UL
NRBC BLD AUTO-RTO: 0 /100
PLATELET # BLD AUTO: 312 10E3/UL (ref 150–450)
POTASSIUM SERPL-SCNC: 4.3 MMOL/L (ref 3.4–5.3)
RBC # BLD AUTO: 4.19 10E6/UL (ref 4.4–5.9)
SODIUM SERPL-SCNC: 132 MMOL/L (ref 135–145)
TROPONIN T SERPL HS-MCNC: 15 NG/L
WBC # BLD AUTO: 6.4 10E3/UL (ref 4–11)

## 2024-03-18 PROCEDURE — 36415 COLL VENOUS BLD VENIPUNCTURE: CPT | Performed by: EMERGENCY MEDICINE

## 2024-03-18 PROCEDURE — 85610 PROTHROMBIN TIME: CPT | Performed by: EMERGENCY MEDICINE

## 2024-03-18 PROCEDURE — 99285 EMERGENCY DEPT VISIT HI MDM: CPT | Mod: 25

## 2024-03-18 PROCEDURE — 70450 CT HEAD/BRAIN W/O DYE: CPT | Mod: MG,XU

## 2024-03-18 PROCEDURE — 80048 BASIC METABOLIC PNL TOTAL CA: CPT | Performed by: EMERGENCY MEDICINE

## 2024-03-18 PROCEDURE — 250N000013 HC RX MED GY IP 250 OP 250 PS 637: Performed by: EMERGENCY MEDICINE

## 2024-03-18 PROCEDURE — 250N000011 HC RX IP 250 OP 636: Performed by: STUDENT IN AN ORGANIZED HEALTH CARE EDUCATION/TRAINING PROGRAM

## 2024-03-18 PROCEDURE — 250N000009 HC RX 250: Performed by: STUDENT IN AN ORGANIZED HEALTH CARE EDUCATION/TRAINING PROGRAM

## 2024-03-18 PROCEDURE — 85025 COMPLETE CBC W/AUTO DIFF WBC: CPT | Performed by: EMERGENCY MEDICINE

## 2024-03-18 PROCEDURE — 85730 THROMBOPLASTIN TIME PARTIAL: CPT | Performed by: EMERGENCY MEDICINE

## 2024-03-18 PROCEDURE — 84484 ASSAY OF TROPONIN QUANT: CPT | Performed by: EMERGENCY MEDICINE

## 2024-03-18 PROCEDURE — 93005 ELECTROCARDIOGRAM TRACING: CPT

## 2024-03-18 PROCEDURE — 70496 CT ANGIOGRAPHY HEAD: CPT | Mod: MG

## 2024-03-18 PROCEDURE — 0042T CT HEAD PERFUSION W CONTRAST: CPT

## 2024-03-18 RX ORDER — IOPAMIDOL 755 MG/ML
500 INJECTION, SOLUTION INTRAVASCULAR ONCE
Status: COMPLETED | OUTPATIENT
Start: 2024-03-18 | End: 2024-03-18

## 2024-03-18 RX ORDER — MORPHINE SULFATE 15 MG/1
7.5 TABLET ORAL ONCE
Status: COMPLETED | OUTPATIENT
Start: 2024-03-18 | End: 2024-03-18

## 2024-03-18 RX ADMIN — MORPHINE SULFATE 7.5 MG: 15 TABLET ORAL at 15:26

## 2024-03-18 RX ADMIN — SODIUM CHLORIDE 80 ML: 9 INJECTION, SOLUTION INTRAVENOUS at 14:13

## 2024-03-18 RX ADMIN — IOPAMIDOL 117 ML: 755 INJECTION, SOLUTION INTRAVENOUS at 14:13

## 2024-03-18 ASSESSMENT — ACTIVITIES OF DAILY LIVING (ADL)
ADLS_ACUITY_SCORE: 35

## 2024-03-18 NOTE — ED TRIAGE NOTES
Pt had a stroke in 2022, during that time he was given a CT that showed multiple little strokes.  PT has a hx of headaches but today it is much more painful than normal, he states usually the pain is between a 3-10/10 today he states it is a 30.  Pt also has an aneurysm in his head.       Triage Assessment (Adult)       Row Name 03/18/24 0283          Triage Assessment    Airway WDL WDL        Respiratory WDL    Respiratory WDL WDL        Skin Circulation/Temperature WDL    Skin Circulation/Temperature WDL WDL        Cardiac WDL    Cardiac WDL WDL        Cognitive/Neuro/Behavioral WDL    Cognitive/Neuro/Behavioral WDL X  headache

## 2024-03-18 NOTE — ED PROVIDER NOTES
History     Chief Complaint:  Stroke Symptoms       HPI   Dominguez Jordan is a 59 year old male who has a history of DM type II, TBI, stroke, and presents with stroke symptoms. The patient has had chronic daily headaches for the past 41 yeas after sustaining a TBI. However today he has had severe headaches, which he rates it to be a 30/10. Ex-wife is in the room and states that he has never had a headache of that level of severity. Last night it was a 20/10, before improving this morning, until this afternoon when it worsened.    Independent Historian:    Ex-Spouse: they report HPI above      Medications:    amlodipine   atorvastatin   docusate sodium   glipizide  hydroxyzine  nortriptyline  oxycodone  tamsulosin     Past Medical History:    DM type II  Seizures  TBI    Past Surgical History:    Orthopedic surgery     Physical Exam   No data found.       Physical Exam  General: Patient is alert and cooperative.  HENT:  Normal parents.  There is no facial weakness or asymmetry.  No facial swelling, nasal congestion or drainage.  Normal TMs.  Eyes: EOMI. Normal conjunctiva.  Neck:  Normal range of motion and appearance.   Cardiovascular:  Normal rate.   Pulmonary/Chest:  Effort normal.  Abdominal: Soft. No distension or tenderness.     Musculoskeletal: Normal range of motion. No edema or tenderness.   Neurological: oriented x 3.  Answering questions appropriately.  Normal strength, sensation, and coordination.   Skin: Warm and dry. No rash or bruising.   Psychiatric: Normal mood and affect. Normal behavior and judgement.      Emergency Department Course   ECG  ECG taken at 1526  Normal sinus rhythm  Low Voltage QRS  Borderline ECG   Rate 69 bpm. MI interval 182 ms. QRS duration 98 ms. QT/QTc 406/435 ms. P-R-T axes 41/ 29/ 55.     Imaging:  CT Head Perfusion w Contrast - For Tier 2 Stroke   Final Result   IMPRESSION: Normal CT perfusion of the brain.         CONSTANZA WIGGINS MD            SYSTEM ID:  [id number]       CTA Head Neck with Contrast   Final Result   IMPRESSION:   1. Occlusion of the V4 segment of the left vertebral artery again   noted without change.   2. Mild presumed atherosclerotic narrowing of the M1 segments of the   middle cerebral arteries bilaterally and P2 segments of the posterior   cerebral arteries bilaterally again noted without change.   3. Otherwise, normal neck and head CTA.         CONSTANZA WIGGINS MD            SYSTEM ID:  [id number]      CT Head w/o Contrast   Final Result   IMPRESSION: Diffuse cerebral volume loss and cerebral white matter   changes consistent with chronic small vessel ischemic disease. No   evidence for acute intracranial pathology.                  CONSTANZA WIGGINS MD            SYSTEM ID:  [id number]          Laboratory:  Labs Ordered and Resulted from Time of ED Arrival to Time of ED Departure   BASIC METABOLIC PANEL - Abnormal       Result Value    Sodium 132 (*)     Potassium 4.3      Chloride 100      Carbon Dioxide (CO2) 22      Anion Gap 10      Urea Nitrogen 15.0      Creatinine 1.07      GFR Estimate 79      Calcium 8.2 (*)     Glucose 93     CBC WITH PLATELETS AND DIFFERENTIAL - Abnormal    WBC Count 6.4      RBC Count 4.19 (*)     Hemoglobin 12.8 (*)     Hematocrit 38.7 (*)     MCV 92      MCH 30.5      MCHC 33.1      RDW 14.3      Platelet Count 312      % Neutrophils 65      % Lymphocytes 23      % Monocytes 10      % Eosinophils 1      % Basophils 1      % Immature Granulocytes 0      NRBCs per 100 WBC 0      Absolute Neutrophils 4.1      Absolute Lymphocytes 1.5      Absolute Monocytes 0.7      Absolute Eosinophils 0.1      Absolute Basophils 0.1      Absolute Immature Granulocytes 0.0      Absolute NRBCs 0.0     INR - Normal    INR 1.14     PARTIAL THROMBOPLASTIN TIME - Normal    aPTT 34     TROPONIN T, HIGH SENSITIVITY - Normal    Troponin T, High Sensitivity 15        Emergency Department Course & Assessments:    Interventions:  Medications   CT Scan Flush (80  mLs Intravenous $Given 3/18/24 1413)   iopamidol (ISOVUE-370) solution 500 mL (117 mLs Intravenous $Given 3/18/24 1413)   morphine (MSIR) IR half-tab 7.5 mg (7.5 mg Oral $Given 3/18/24 1526)      Assessments:  1356 Tier 2 Code Stroke.  1440 I obtained history and examined patient.   1450 Tier 2 Code Stroke deescalated.    Independent Interpretation (X-rays, CTs, rhythm strip):  None    Consultations/Discussion of Management or Tests:  None  ED Course as of 24 1008   Mon Mar 18, 2024   1433 Alireza Chan, radiology call       Social Determinants of Health affecting care:  None     Disposition:  The patient was discharged.    Impression & Plan    Medical Decision Makin year old male with hx remote tbi, subsequent chronic daily headaches, on prn home oral narcotics, arrives with worsening headache since yesterday.  Hx non ruptured aneurysm.  Seen initially in intake; given presentation, tier 2 code stroke activated; neuroimaging demonstrates no ICH, CVA, dissection, or other acute finding.  Neuro exam wnl. Results discussed; advise follow up with his clinical, including pain team for further management.   Diagnosis:    ICD-10-CM    1. Chronic daily headache  R51.9       2. History of traumatic brain injury  Z87.820            Discharge Medications:  Discharge Medication List as of 3/18/2024  3:56 PM             Scribe Disclosure:  JOSE Villahi Hired, am serving as a scribe at 2:46 PM on 3/18/2024 to document services personally performed by Edilberto Diaz MD based on my observations and the provider's statements to me.  3/18/2024   Edilberto Diaz MD Isaacson, Brian A, MD  24 1013

## 2024-03-18 NOTE — PROGRESS NOTES
Vascular Neurology Note    Stroke code called in triage; canceled after assessed by provider.     Shelley Lombardo, NP

## 2024-03-18 NOTE — ED NOTES
"Triage Evaluation    Patient presented with his wife for evaluation of headache and weakness. The patient has history of CVA two years ago and TBI with headaches. Yesterday the patient developed  \"20/10\" headache that slowly improved with morphine. This has not resolved and now is a \"30/10\" headache. Patient also described generalized weakness. He state his headache is from the base of his neck and radiates up, describing the pain as \"someone blowing up a ball inside,\" his head. He also notes vision changes described as his \"eye are sitting in water.\" The patient endorses general weakness.  He is not anticoagulated. He has a history of cerebral aneurysm.     Exam is notable for:  No facial droop. 5/5 strength in bilateral upper extremities.     Appropriate interventions for symptom management were initiated if applicable.  Appropriate diagnostic tests were initiated if indicated.    Important information for subsequent clinician:  Tier 2 Code Stroke called for concern for intracranial hemorrhage    I briefly evaluated the patient and developed an initial plan of care. I discussed this plan and explained that this brief interaction does not constitute a full evaluation. Patient/family understands that they should wait to be fully evaluated and discuss any test results with another clinician prior to leaving the hospital.       Bubba Godoy MD  03/18/24 1401    "

## 2024-03-19 LAB
ATRIAL RATE - MUSE: 69 BPM
DIASTOLIC BLOOD PRESSURE - MUSE: NORMAL MMHG
INTERPRETATION ECG - MUSE: NORMAL
P AXIS - MUSE: 41 DEGREES
PR INTERVAL - MUSE: 182 MS
QRS DURATION - MUSE: 98 MS
QT - MUSE: 406 MS
QTC - MUSE: 435 MS
R AXIS - MUSE: 29 DEGREES
SYSTOLIC BLOOD PRESSURE - MUSE: NORMAL MMHG
T AXIS - MUSE: 55 DEGREES
VENTRICULAR RATE- MUSE: 69 BPM

## 2024-04-05 ENCOUNTER — MEDICAL CORRESPONDENCE (OUTPATIENT)
Dept: HEALTH INFORMATION MANAGEMENT | Facility: CLINIC | Age: 59
End: 2024-04-05
Payer: COMMERCIAL

## 2024-04-08 ENCOUNTER — TRANSCRIBE ORDERS (OUTPATIENT)
Dept: OTHER | Age: 59
End: 2024-04-08

## 2024-04-08 ENCOUNTER — TELEPHONE (OUTPATIENT)
Dept: NEUROLOGY | Facility: CLINIC | Age: 59
End: 2024-04-08
Payer: COMMERCIAL

## 2024-04-08 DIAGNOSIS — G43.E09 CHRONIC MIGRAINE WITH AURA WITHOUT STATUS MIGRAINOSUS, NOT INTRACTABLE: ICD-10-CM

## 2024-04-08 DIAGNOSIS — S06.9X9S TRAUMATIC BRAIN INJURY WITH LOSS OF CONSCIOUSNESS, SEQUELA (H): Primary | ICD-10-CM

## 2024-04-08 NOTE — TELEPHONE ENCOUNTER
Caller spoke with the pt and scheduled an appointment in [place] with Dr. Kirk. Pt asked for a reminder letter regarding the appointment. Patient Stated that they are currently staying with a friend and asked the letter be sent to friends address at     [address] [place], [place] [id number]    Caller verified address with the pt.    4/8/24 BD

## 2024-06-14 NOTE — TELEPHONE ENCOUNTER
Records Requested     June 14, 2024 1:14 PM   [id number]   Facility  [facility]   Outcome 1:17 am Sent request for imaging to be pushed to PACS. -KRYSTYNA Lozoya on 7/5/2024 at 9:00 AM Imaging resolved into PACS. -KRYSTYNA     RECORDS RECEIVED FROM: Care Everywhere   REASON FOR VISIT: Chronic migraine with aura without status migrainosus, not intractable   PROVIDER: Rachelle Youngblood APRN CNP   DATE OF APPT: 8/13/24 @ 11:00 am    NOTES (FOR ALL VISITS) STATUS DETAILS   OFFICE NOTE from referring provider Care Everywhere 4/5/24 Max Mosqueda DO  @[facility]     OFFICE NOTE from other specialist Care Everywhere 11/22/22, 11/2/22 Dominguez Mckeon MD  [facility]     DISCHARGE REPORT from the ER Care Everywhere 3/18/24 Edilberto Diaz MD @[facility]    12/16/23 Virginia Emmanuel MD @[facility]    9/20/22 Belle Araiza MD  @[facility]     MEDICATION LIST Internal    IMAGING  (FOR ALL VISITS)     MRI (HEAD, NECK, SPINE) Internal [facility]  12/16/23 MR Brain    [facility]  9/20/22 MR Brain     CT (HEAD, NECK, SPINE) Internal  [facility]  3/18/24 CT Head Perfusion  3/18/24 CTA Head Neck  3/18/24 CT Head  12/16/23 CTA Head Neck  12/16/23 CT Head    [facility]  3/1/24 CT Temporal

## 2024-08-13 ENCOUNTER — PRE VISIT (OUTPATIENT)
Dept: NEUROLOGY | Facility: CLINIC | Age: 59
End: 2024-08-13

## 2024-08-13 ENCOUNTER — OFFICE VISIT (OUTPATIENT)
Dept: NEUROLOGY | Facility: CLINIC | Age: 59
End: 2024-08-13
Attending: FAMILY MEDICINE
Payer: COMMERCIAL

## 2024-08-13 VITALS
OXYGEN SATURATION: 96 % | WEIGHT: 209.5 LBS | HEIGHT: 71 IN | SYSTOLIC BLOOD PRESSURE: 141 MMHG | HEART RATE: 67 BPM | DIASTOLIC BLOOD PRESSURE: 91 MMHG | BODY MASS INDEX: 29.33 KG/M2

## 2024-08-13 DIAGNOSIS — S06.9X9S TRAUMATIC BRAIN INJURY WITH LOSS OF CONSCIOUSNESS, SEQUELA (H): ICD-10-CM

## 2024-08-13 DIAGNOSIS — G43.E09 CHRONIC MIGRAINE WITH AURA WITHOUT STATUS MIGRAINOSUS, NOT INTRACTABLE: Primary | ICD-10-CM

## 2024-08-13 PROCEDURE — 99417 PROLNG OP E/M EACH 15 MIN: CPT | Performed by: NURSE PRACTITIONER

## 2024-08-13 PROCEDURE — 99205 OFFICE O/P NEW HI 60 MIN: CPT | Performed by: NURSE PRACTITIONER

## 2024-08-13 RX ORDER — BUPRENORPHINE HYDROCHLORIDE AND NALOXONE HYDROCHLORIDE DIHYDRATE 2; .5 MG/1; MG/1
TABLET SUBLINGUAL
COMMUNITY

## 2024-08-13 ASSESSMENT — HEADACHE IMPACT TEST (HIT 6)
HOW OFTEN HAVE YOU FELT TOO TIRED TO WORK BECAUSE OF YOUR HEADACHES: VERY OFTEN
HOW OFTEN DO HEADACHES LIMIT YOUR DAILY ACTIVITIES: VERY OFTEN
HIT6 TOTAL SCORE: 65
WHEN YOU HAVE A HEADACHE HOW OFTEN DO YOU WISH YOU COULD LIE DOWN: VERY OFTEN
HOW OFTEN DID HEADACHS LIMIT CONCENTRATION ON WORK OR DAILY ACTIVITY: VERY OFTEN
HOW OFTEN HAVE YOU FELT FED UP OR IRRITATED BECAUSE OF YOUR HEADACHES: RARELY
WHEN YOU HAVE HEADACHES HOW OFTEN IS THE PAIN SEVERE: ALWAYS

## 2024-08-13 ASSESSMENT — MIGRAINE DISABILITY ASSESSMENT (MIDAS)
HOW OFTEN WERE SOCIAL ACTIVITIES MISSED DUE TO HEADACHES: 25
ON A SCALE FROM 0-10 ON AVERAGE HOW PAINFUL WERE HEADACHES: 7
HOW MANY DAYS DID YOU MISS WORK OR SCHOOL BECAUSE OF HEADACHES: 25
HOW MANY DAYS WAS HOUSEWORK PRODUCTIVITY CUT IN HALF DUE TO HEADACHES: 45
HOW MANY DAYS IN THE PAST 3 MONTHS HAVE YOU HAD A HEADACHE: 90
HOW MANY DAYS WAS YOUR PRODUCTIVITY CUT IN HALF BECAUSE OF HEADACHES: 35
TOTAL SCORE: 175
HOW MANY DAYS DID YOU NOT DO HOUSEWORK BECAUSE OF HEADACHES: 45

## 2024-08-13 ASSESSMENT — PAIN SCALES - GENERAL: PAINLEVEL: EXTREME PAIN (9)

## 2024-08-13 NOTE — NURSING NOTE
Chief Complaint   Patient presents with    New Patient     Headache      Vitals were taken and medications were reconciled.   Nloan Parisi, EMT  11:20 AM
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
## 2024-08-13 NOTE — PATIENT INSTRUCTIONS
Acute Treatment:  -For acute treatment of mild headache, take ibuprofen as needed. Do not exceed more than 14 days per month to avoid medication overuse.  -For acute treatment of moderate to severe headache, take nurtec  take it instead of ibuprofen   Side effects of nurtec -nausea     Preventive Treatment:  -For headache prevention, Recommended a trial of migraine preventive treatment with Emgality. Side effects-allergic reaction or pain in the injection side or redness in the injection side. Unknown side effects with a long term use and prevent your vessels from dilating. Stop if any repeat stroke or any other vascular events     Follow up in 3 months after starting Emgality     Balance problems but getting worse and feels like knee /ankle giving up. History of stroke and  mg. Head CTA recently. Would suggest to follow up with   Suggested to Follow up with general neurology for balance problems  Review secondary stroke prevention with PCP/Stroke Clinic -patient is off his antihypertensive, antilipid treatment, DM management.

## 2025-02-20 ENCOUNTER — TRANSCRIBE ORDERS (OUTPATIENT)
Dept: OTHER | Age: 60
End: 2025-02-20

## 2025-02-20 DIAGNOSIS — R26.9 ABNORMALITY OF GAIT: ICD-10-CM

## 2025-02-20 DIAGNOSIS — R29.6 FALLS FREQUENTLY: ICD-10-CM

## 2025-02-20 DIAGNOSIS — R29.6 FREQUENT FALLS: ICD-10-CM

## 2025-02-20 DIAGNOSIS — G81.91 HEMIPARESIS, RIGHT (H): ICD-10-CM

## 2025-02-20 DIAGNOSIS — I63.9 CEREBROVASCULAR ACCIDENT (H): ICD-10-CM

## 2025-02-20 DIAGNOSIS — R26.81 UNSTEADINESS ON FEET: Primary | ICD-10-CM

## 2025-02-20 DIAGNOSIS — R42 DIZZINESS AND GIDDINESS: ICD-10-CM

## 2025-02-27 ENCOUNTER — THERAPY VISIT (OUTPATIENT)
Dept: PHYSICAL THERAPY | Facility: CLINIC | Age: 60
End: 2025-02-27
Attending: PHYSICIAN ASSISTANT
Payer: MEDICARE

## 2025-02-27 DIAGNOSIS — R29.6 FALLS: Primary | ICD-10-CM

## 2025-02-27 PROCEDURE — 97162 PT EVAL MOD COMPLEX 30 MIN: CPT | Mod: GP

## 2025-02-27 NOTE — PROGRESS NOTES
PHYSICAL THERAPY EVALUATION  Type of Visit: Evaluation        Fall Risk Screen:  Fall screen completed by: PT  Have you fallen 2 or more times in the past year?: Yes  Have you fallen and had an injury in the past year?: Yes  Is patient a fall risk?: Yes    Subjective   Patient is a 60 year old male who presents with a referral diagnosis of frequent falls and unstediness. The patient has a hx of TBI in 1982 but was able to work and live independently. 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 He went home from that hospitalization with significant assist with walker and 24/7 family care. The patient worked with HHPT for about a year following his stroke and now utilizes right AFO and walking stick. In addition to HHPT, he has done vision therapy and most recently a POC at [facility] for continued dizziness from his stoke. He has had an increasing amount of falls with a bad one on Sunday walking out of his barn, turned too fast and LOB. Not consistent with AFO. Falls more in the summer due to increased activity. Does not fall much in the house because there are things to hold on to. He would like to be more secure with walking and reduce falls with PT.        Presenting condition or subjective complaint: balance and dizziness since stoke referred by [facility] wants to learn to walk more up right again tendsto lean to thw left  Date of onset: 02/20/25 (referral date)    Relevant medical history: Concussions; Diabetes; Dizziness; Foot drop; High blood pressure; Migraines or headaches; Seizures; Severe dizziness; Severe headaches; Stroke; Vision problems   Dates & types of surgery: 2 eyesurguries, cataract surgury each eye, appendix out    Prior diagnostic imaging/testing results: MRI; CT scan     Prior therapy history for the same diagnosis, illness or injury: Yes previous ongoing PT and OT for past three years    Prior Level of Function  Transfers: Assistive equipment  Ambulation: Assistive  equipment  ADL: Assistive equipment, Assistive person    Living Environment  Social support: With family members   Type of home:     Stairs to enter the home:         Ramp:     Stairs inside the home:         Help at home:    Equipment owned:       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   Hobbies/Interests:      Patient goals for therapy: return to part-time work    Pain assessment: Pain denied     Objective      Cognitive Status Examination  Orientation: Oriented to person, place and time   Level of Consciousness: Alert  Follows Commands and Answers Questions: Follows multi step instructions  Personal Safety and Judgement: At risk behaviors demonstrated  Memory: Impaired short term     OBSERVATION: Patient ambulating with walking stick  INTEGUMENTARY: Intact  POSTURE:  Patient with L sided lean  PALPATION: NT  RANGE OF MOTION: LE ROM WFL  STRENGTH: Not formally assessed on this date - demonstrates functional reduction of RLE strength, use of AFO for foot drop    BED MOBILITY: Independent    TRANSFERS: SBA      GAIT:   Level of [place]: CGA  Assistive Device(s):  Walking stick in L hand  Gait Deviations:  Increased R stance, reduced L stride length, R toe out, reduced R foot clearance, reduced speed, instability throughout  Gait Distance: within clinic  Stairs: NT will assess at future visits    BALANCE:  Dyanamic balance impaired functionally in gait, difficulty with maintaining balance with turns and change in gait speed. To be formally assessed at next visit.     Assessment & Plan   CLINICAL IMPRESSIONS  Medical Diagnosis: R26.81 (ICD-10-CM) - Unsteadiness on feet  R29.6 (ICD-10-CM) - Frequent fall    Treatment Diagnosis: Balance deficits, force productiond defcitis, gait abnormality   Impression/Assessment: Patient is a 60 year old male with a history of TBI and more recent CVA presents with a referral diagnosis of frequent falls and unsteadiness.  The following significant findings have been identified:  Decreased strength, Impaired balance, Impaired sensation, Impaired gait, Impaired muscle performance, Decreased activity tolerance, Instability, and Impaired vision. These impairments interfere with their ability to perform self care tasks, recreational activities, household chores, household mobility, and community mobility as compared to previous level of function. He will benefit from continued skilled PT intervention to address deficits and progress goals.     Clinical Decision Making (Complexity):  Clinical Presentation: Evolving/Changing  Clinical Presentation Rationale: based on medical and personal factors listed in PT evaluation  Clinical Decision Making (Complexity): Moderate complexity    PLAN OF CARE  Treatment Interventions:  Interventions: Gait Training, Manual Therapy, Neuromuscular Re-education, Therapeutic Activity, Therapeutic Exercise, Self-Care/Home Management    Long Term Goals     PT Goal 1  Goal Identifier: HEP  Goal Description: Pt will be able to demonstrate HEP activities without need for cues from provider to demonstrate understanding and independence with HEP.  Rationale: to maximize safety and independence with performance of ADLs and functional tasks;to maximize safety and independence within the home;to maximize safety and independence within the community  Target Date: 05/27/25  PT Goal 2  Goal Identifier: BBS  Goal Description: Pt will demonstrate a 3 point improvement on the BBS to demonstrate minimum clinically significant difference in score to demonstrate improved safety with functional mobility and decrease risk of falls.  Rationale: to maximize safety and independence within the home;to maximize safety and independence with performance of ADLs and functional tasks;to maximize safety and independence within the community  Target Date: 05/27/25  PT Goal 3  Goal Identifier: Gait speed  Goal Description: Pt will demonstrate a 0.12 second improvement in gait speed to demonstrate  minimum clinically significant difference in score to demonstrate improved gait velocity.  Rationale: to maximize safety and independence with performance of ADLs and functional tasks;to maximize safety and independence within the home;to maximize safety and independence within the community  Target Date: 05/27/25  PT Goal 4  Goal Identifier: TUG  Goal Description: Pt will demonstrate a 6 second improvement on TUG to demonstrate a clinically significant difference in score to demonstrate improved decreased risk of falls, improved mobility, and balance  Rationale: to maximize safety and independence with performance of ADLs and functional tasks;to maximize safety and independence within the home;to maximize safety and independence within the community  Target Date: 05/27/25      Frequency of Treatment: 2x/week, decreasing as appropriate  Duration of Treatment: 12 weeks    Recommended Referrals to Other Professionals:  TBD  Education Assessment:   Learner/Method: Patient;Demonstration;Listening  Education Comments: Pt verbalizes/demos understanding of education.    Risks and benefits of evaluation/treatment have been explained.   Patient/Family/caregiver agrees with Plan of Care.     Evaluation Time:     PT Eval, Moderate Complexity Minutes (59195): 38     Signing Clinician: Jacque Almodovar, PT, DPT        [facility]                                                                                   OUTPATIENT PHYSICAL THERAPY      PLAN OF TREATMENT FOR OUTPATIENT REHABILITATION   Patient's Last Name, First Name, Dominguez Kumar YOB: 1965   Provider's Name   [facility]   Medical Record No.  [id number]     Onset Date: 02/20/25 (referral date)  Start of Care Date: 02/27/25     Medical Diagnosis:  R26.81 (ICD-10-CM) - Unsteadiness on feet  R29.6 (ICD-10-CM) - Frequent fall      PT Treatment Diagnosis:  Balance deficits, force  productiond defcitis, gait abnormality Plan of Treatment  Frequency/Duration: 2x/week, decreasing as appropriate/ 12 weeks    Certification date from 02/27/25 to 05/27/25         See note for plan of treatment details and functional goals     Jacque Almodovar, PT, DPT                        I CERTIFY THE NEED FOR THESE SERVICES FURNISHED UNDER        THIS PLAN OF TREATMENT AND WHILE UNDER MY CARE .             Physician Signature               Date    X_____________________________________________________                  Referring Provider:  Leann Matt    Initial Assessment  See Epic Evaluation- Start of Care Date: 02/27/25

## 2025-03-07 ENCOUNTER — THERAPY VISIT (OUTPATIENT)
Dept: PHYSICAL THERAPY | Facility: CLINIC | Age: 60
End: 2025-03-07
Attending: PHYSICIAN ASSISTANT
Payer: MEDICARE

## 2025-03-07 DIAGNOSIS — R29.6 FALLS: Primary | ICD-10-CM

## 2025-03-07 PROCEDURE — 97112 NEUROMUSCULAR REEDUCATION: CPT | Mod: GP

## 2025-03-07 PROCEDURE — 97750 PHYSICAL PERFORMANCE TEST: CPT | Mod: GP

## 2025-03-20 ENCOUNTER — THERAPY VISIT (OUTPATIENT)
Dept: PHYSICAL THERAPY | Facility: CLINIC | Age: 60
End: 2025-03-20
Attending: PHYSICIAN ASSISTANT
Payer: MEDICARE

## 2025-03-20 DIAGNOSIS — R29.6 FALLS: Primary | ICD-10-CM

## 2025-03-20 PROCEDURE — 97535 SELF CARE MNGMENT TRAINING: CPT | Mod: GP

## 2025-03-24 ENCOUNTER — THERAPY VISIT (OUTPATIENT)
Dept: PHYSICAL THERAPY | Facility: CLINIC | Age: 60
End: 2025-03-24
Attending: PHYSICIAN ASSISTANT
Payer: MEDICARE

## 2025-03-24 DIAGNOSIS — R29.6 FALLS: Primary | ICD-10-CM

## 2025-03-24 PROCEDURE — 97112 NEUROMUSCULAR REEDUCATION: CPT | Mod: GP | Performed by: PHYSICAL THERAPIST

## 2025-03-25 NOTE — PATIENT INSTRUCTIONS
Physical Therapy 3/24/25  Try to get 100-150 pedals on your bike per day.  Stop if your headache gets to a 7/10.   Avoid napping  Use a walker with two wheels when out of your house.  Sit at the edge of your chair and move your head in a small range left and right like you are shaking your head no 15 times.  Try to do this 2-3x/day    Sit at the edge of your chair and move your head in a small range up and down like you are shaking your head yes 15 times.  Try to do this 2-3x/day     To get headache and dizziness to settle try:  Sit in your chair with your eyes closed:  4-7-8 breathing   Breathe in for 4 seconds  Hold for 7 seconds  Breathe out for 8 seconds  Repeat as many times as you need to allow your symptoms to settle.

## 2025-03-27 ENCOUNTER — THERAPY VISIT (OUTPATIENT)
Dept: PHYSICAL THERAPY | Facility: CLINIC | Age: 60
End: 2025-03-27
Attending: PHYSICIAN ASSISTANT
Payer: MEDICARE

## 2025-03-27 DIAGNOSIS — R29.6 FALLS: Primary | ICD-10-CM

## 2025-03-27 PROCEDURE — 97112 NEUROMUSCULAR REEDUCATION: CPT | Mod: GP | Performed by: PHYSICAL THERAPIST

## 2025-03-27 PROCEDURE — 97116 GAIT TRAINING THERAPY: CPT | Mod: GP | Performed by: PHYSICAL THERAPIST

## 2025-03-27 NOTE — PATIENT INSTRUCTIONS
Physical Therapy 3/27/25  Try to get 150-200 pedals on your bike per day.  Stop if your headache gets to a 7/10.   Avoid napping  Use a walker with two wheels when out of your house. Take a bigger step with your left leg (not doing the splits big)  Sit at the edge of your chair and move your head in a small range left and right like you are shaking your head no 15 times.  Try to do this 2-3x/day    Sit at the edge of your chair and move your head in a small range up and down like you are shaking your head yes 15 times.  Try to do this 2-3x/day      To get headache and dizziness to settle try:  Sit in your chair with your eyes closed:  4-7-8 breathing   Breathe in for 4 seconds  Hold for 7 seconds  Breathe out for 8 seconds  Repeat as many times as you need to allow your symptoms to settle.      You can do your usual deep breathing if this feels better.     Balance: Standing shift your weight to the right to put more pressure on your right foot. Keep your left foot flat on the ground - don't let your toes pop up. Repeat 10 times shifting to the right.  front of a sturdy chair and keep your walker nearby to hold onto as needed.     Look into  adjust a heel lifts

## 2025-03-31 ENCOUNTER — THERAPY VISIT (OUTPATIENT)
Dept: PHYSICAL THERAPY | Facility: CLINIC | Age: 60
End: 2025-03-31
Attending: PHYSICIAN ASSISTANT
Payer: MEDICARE

## 2025-03-31 DIAGNOSIS — R29.6 FALLS: Primary | ICD-10-CM

## 2025-03-31 PROCEDURE — 97112 NEUROMUSCULAR REEDUCATION: CPT | Mod: GP | Performed by: PHYSICAL THERAPIST

## 2025-04-07 ENCOUNTER — THERAPY VISIT (OUTPATIENT)
Dept: PHYSICAL THERAPY | Facility: CLINIC | Age: 60
End: 2025-04-07
Attending: PHYSICIAN ASSISTANT
Payer: MEDICARE

## 2025-04-07 DIAGNOSIS — R29.6 FALLS: Primary | ICD-10-CM

## 2025-04-07 PROCEDURE — 97112 NEUROMUSCULAR REEDUCATION: CPT | Mod: GP | Performed by: PHYSICAL THERAPIST

## 2025-04-07 PROCEDURE — 97116 GAIT TRAINING THERAPY: CPT | Mod: GP | Performed by: PHYSICAL THERAPIST

## 2025-04-14 ENCOUNTER — THERAPY VISIT (OUTPATIENT)
Dept: PHYSICAL THERAPY | Facility: CLINIC | Age: 60
End: 2025-04-14
Attending: PHYSICIAN ASSISTANT
Payer: MEDICARE

## 2025-04-14 DIAGNOSIS — R29.6 FALLS: Primary | ICD-10-CM

## 2025-04-14 PROCEDURE — 97112 NEUROMUSCULAR REEDUCATION: CPT | Mod: GP | Performed by: PHYSICAL THERAPIST

## 2025-04-14 NOTE — PROGRESS NOTES
04/14/25 0500   Appointment Info   Signing clinician's name / credentials Radha Morris, PT, DPT; Billie Cesar, SPT   Visits Used 10 (PN)   Medical Diagnosis R26.81 (ICD-10-CM) - Unsteadiness on feet  R29.6 (ICD-10-CM) - Frequent fall   PT Tx Diagnosis Balance deficits, force productiond defcitis, gait abnormality   Precautions/Limitations impulsive behaviors   Quick Adds Student Supervision   Progress Note/Certification   Start of Care Date 02/27/25   Onset of illness/injury or Date of Surgery 02/20/25  (referral date)   Therapy Frequency 2x/week, decreasing as appropriate   Predicted Duration 12 weeks   Certification date from 02/27/25   Certification date to 05/27/25   Progress Note Completed Date 02/27/25   Supervision   Student Supervision Therapy services provided with the co-signing licensed therapist guiding and directing the services, and providing the skilled judgement and assessment throughout the session;Direct supervision provided;Direct Patient Contact Provided   PT Goal 1   Goal Identifier HEP   Goal Description Pt will be able to demonstrate HEP activities without need for cues from provider to demonstrate understanding and independence with HEP.   Rationale to maximize safety and independence with performance of ADLs and functional tasks;to maximize safety and independence within the home;to maximize safety and independence within the community   Target Date 05/27/25   PT Goal 2   Goal Identifier BBS   Goal Description Pt will demonstrate a 3 point improvement on the BBS to demonstrate minimum clinically significant difference in score to demonstrate improved safety with functional mobility and decrease risk of falls.   Rationale to maximize safety and independence within the home;to maximize safety and independence with performance of ADLs and functional tasks;to maximize safety and independence within the community   Target Date 05/27/25   Date Met 04/14/25   Goal Progress 4/14: 33/56   PT Goal 3  "  Goal Identifier Gait speed   Goal Description Pt will demonstrate a 0.12 second improvement in gait speed to demonstrate minimum clinically significant difference in score to demonstrate improved gait velocity.   Rationale to maximize safety and independence with performance of ADLs and functional tasks;to maximize safety and independence within the home;to maximize safety and independence within the community   Target Date 05/27/25   PT Goal 4   Goal Identifier TUG   Goal Description Pt will demonstrate a 6 second improvement on TUG to demonstrate a clinically significant difference in score to demonstrate improved decreased risk of falls, improved mobility, and balance   Rationale to maximize safety and independence with performance of ADLs and functional tasks;to maximize safety and independence within the home;to maximize safety and independence within the community   Target Date 05/27/25   Goal Progress 4/14: 17.71 sec   Subjective Report   Subjective Report Patient notes that his low back has been hurting since trying to lift a log. Has a 6/10 headache today. Has been completing his home exercises with no issue.   Objective Measure 1   Objective Measure TUG   Details 17.71 seconds with use of walker   Treatment Interventions (PT)   Interventions Therapeutic Activity;Neuromuscular Re-education;Therapeutic Procedure/Exercise   Neuromuscular Re-education   Neuromuscular re-ed of mvmt, balance, coord, kinesthetic sense, posture, proprioception minutes (53068) 40   Neuro Re-ed 1 - Details Standing on foam, blazepod x3 left foot taps on 6\" step x 1 minute to help facilitate weight shift and dynamic balance. Demonstrates increased fatigue with low back pain. Did not complete with R foot taps due to pain. Completed walking x20 ft with obstacles to mimic rocks in community - VC and education for proper posture/ergonomics with walker to avoid falls. Tested dynamic and static balance via Coronel Balance test. Discussed " results and implications to POC. Tested safety with balance/ fall risk via TUG test.   Skilled Intervention Education, cues, facilitation for weight shift, guarding   Patient Response/Progress Slight increase in headache symptoms which resolved below baseline upon completion of exercises.   Self Care/home Management   ADL/Home Mgmt Training (24166) 2   Skilled Intervention education   Self Care 1 - Details Education on icing back to manage pain levels .   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 Reviewed gait mechanics with walker today with extensive education on keeping the walker close to avoid the device tipping. Pt Coronel test and TUG scores have improved since last assessment but still indicates that he is at increased risk for falls.   Total Session Time   Timed Code Treatment Minutes 42   Total Treatment Time (sum of timed and untimed services) 42         PLAN  Upon assessment today, patient Coronel Balance test and TUG scores have improved since initial evaluation but his scores still indicate that he is at increased risk for falls.  Pt will benefit from continued skilled PT services to address balance deficits to improve safety with gait and functional ADLs.    Beginning/End Dates of Progress Note Reporting Period:  02/27/25 to 04/14/2025    Referring Provider:  Leann Matt

## 2025-04-21 ENCOUNTER — THERAPY VISIT (OUTPATIENT)
Dept: PHYSICAL THERAPY | Facility: CLINIC | Age: 60
End: 2025-04-21
Attending: PHYSICIAN ASSISTANT
Payer: MEDICARE

## 2025-04-21 DIAGNOSIS — R29.6 FALLS: Primary | ICD-10-CM

## 2025-04-21 PROCEDURE — 97116 GAIT TRAINING THERAPY: CPT | Mod: GP | Performed by: PHYSICAL THERAPIST

## 2025-04-21 PROCEDURE — 97112 NEUROMUSCULAR REEDUCATION: CPT | Mod: GP | Performed by: PHYSICAL THERAPIST

## 2025-04-25 ENCOUNTER — THERAPY VISIT (OUTPATIENT)
Dept: PHYSICAL THERAPY | Facility: CLINIC | Age: 60
End: 2025-04-25
Attending: PHYSICIAN ASSISTANT
Payer: MEDICARE

## 2025-04-25 DIAGNOSIS — R29.6 FALLS: Primary | ICD-10-CM

## 2025-04-25 PROCEDURE — 97116 GAIT TRAINING THERAPY: CPT | Mod: GP | Performed by: PHYSICAL THERAPIST

## 2025-04-25 PROCEDURE — 97112 NEUROMUSCULAR REEDUCATION: CPT | Mod: GP | Performed by: PHYSICAL THERAPIST

## 2025-05-02 ENCOUNTER — THERAPY VISIT (OUTPATIENT)
Dept: PHYSICAL THERAPY | Facility: CLINIC | Age: 60
End: 2025-05-02
Attending: PHYSICIAN ASSISTANT
Payer: MEDICARE

## 2025-05-02 DIAGNOSIS — R29.6 FALLS: Primary | ICD-10-CM

## 2025-05-02 PROCEDURE — 97112 NEUROMUSCULAR REEDUCATION: CPT | Mod: GP | Performed by: PHYSICAL THERAPIST

## 2025-05-05 ENCOUNTER — THERAPY VISIT (OUTPATIENT)
Dept: PHYSICAL THERAPY | Facility: CLINIC | Age: 60
End: 2025-05-05
Attending: PHYSICIAN ASSISTANT
Payer: MEDICARE

## 2025-05-05 DIAGNOSIS — R29.6 FALLS: Primary | ICD-10-CM

## 2025-05-05 PROCEDURE — 97110 THERAPEUTIC EXERCISES: CPT | Mod: GP | Performed by: PHYSICAL THERAPIST

## 2025-05-05 PROCEDURE — 97112 NEUROMUSCULAR REEDUCATION: CPT | Mod: GP | Performed by: PHYSICAL THERAPIST

## 2025-05-21 ENCOUNTER — THERAPY VISIT (OUTPATIENT)
Dept: PHYSICAL THERAPY | Facility: CLINIC | Age: 60
End: 2025-05-21
Attending: PHYSICIAN ASSISTANT
Payer: MEDICARE

## 2025-05-21 DIAGNOSIS — R29.6 FALLS: Primary | ICD-10-CM

## 2025-05-21 DIAGNOSIS — R26.81 UNSTEADINESS ON FEET: ICD-10-CM

## 2025-05-21 PROCEDURE — 97110 THERAPEUTIC EXERCISES: CPT | Mod: GP | Performed by: PHYSICAL THERAPIST

## 2025-05-28 ENCOUNTER — THERAPY VISIT (OUTPATIENT)
Dept: PHYSICAL THERAPY | Facility: CLINIC | Age: 60
End: 2025-05-28
Attending: PHYSICIAN ASSISTANT
Payer: MEDICARE

## 2025-05-28 DIAGNOSIS — R29.6 FALLS: Primary | ICD-10-CM

## 2025-05-28 DIAGNOSIS — R26.81 UNSTEADINESS ON FEET: ICD-10-CM

## 2025-05-28 PROCEDURE — 97112 NEUROMUSCULAR REEDUCATION: CPT | Mod: GP | Performed by: PHYSICAL THERAPIST

## 2025-05-30 NOTE — PROGRESS NOTES
Progress Note/Re-certification       05/28/25 0500   Appointment Info   Signing clinician's name / credentials Radha Morris, PT, DPT   Visits Used 15 (5/20 )   Medical Diagnosis R26.81 (ICD-10-CM) - Unsteadiness on feet  R29.6 (ICD-10-CM) - Frequent fall   PT Tx Diagnosis Balance deficits, force productiond defcitis, gait abnormality   Precautions/Limitations impulsive behaviors   Progress Note/Certification   Start of Care Date 02/27/25   Onset of illness/injury or Date of Surgery 02/20/25  (referral date)   Therapy Frequency 2x/week, decreasing as appropriate   Predicted Duration 12 weeks   Certification date from 05/28/25   Certification date to 08/25/25   Progress Note Completed Date 02/27/25   PT Goal 1   Goal Identifier HEP   Goal Description Pt will be able to demonstrate HEP activities without need for cues from provider to demonstrate understanding and independence with HEP.   Rationale to maximize safety and independence with performance of ADLs and functional tasks;to maximize safety and independence within the home;to maximize safety and independence within the community   Goal Progress 5/28/25: Pt is mostly compliant with HEP   Target Date 08/25/25   PT Goal 2   Goal Identifier BBS   Goal Description Pt will demonstrate a 3 point improvement on the BBS to demonstrate minimum clinically significant difference in score to demonstrate improved safety with functional mobility and decrease risk of falls.   Rationale to maximize safety and independence within the home;to maximize safety and independence with performance of ADLs and functional tasks;to maximize safety and independence within the community   Goal Progress 4/14: 33/56   Target Date 05/27/25   Date Met 04/14/25   PT Goal 3   Goal Identifier Gait speed   Goal Description Pt will demonstrate a 0.12 second improvement in gait speed to demonstrate minimum clinically significant difference in score to demonstrate improved gait velocity.   Rationale to  "maximize safety and independence with performance of ADLs and functional tasks;to maximize safety and independence within the home;to maximize safety and independence within the community   Goal Progress 5/28/29: 0.71 m/s   Target Date 08/25/25   PT Goal 4   Goal Identifier TUG   Goal Description Pt will demonstrate a 6 second improvement on TUG to demonstrate a clinically significant difference in score to demonstrate improved decreased risk of falls, improved mobility, and balance   Rationale to maximize safety and independence with performance of ADLs and functional tasks;to maximize safety and independence within the home;to maximize safety and independence within the community   Goal Progress 4/14: 17.71 sec; 19.94 seconds on 5/28/25 with FWW 17.16 without AD   Target Date 08/25/25   Subjective Report   Subjective Report Presents with ex wife Lili.  No increase in back pain.  Has been very active getting ready for his daughter's graduation party.   Treatment Interventions (PT)   Interventions Therapeutic Activity;Neuromuscular Re-education;Therapeutic Procedure/Exercise;Gait Training   Neuromuscular Re-education   Neuromuscular re-ed of mvmt, balance, coord, kinesthetic sense, posture, proprioception minutes (10323) 39   Neuro Re-ed 1 - Details STS with L LE on 6 inch step to facilitate WBing through R LE x15 reps- increased fatigue.  Blazpod Activites with L LE on 6 inch step with B UE tapping 2x60\",  also performed with single UE tapping for blazepods x60 seconds then again with contra UE tapping 2x60\".  Rest breaks given prn for fatigue.  L LE blazpods pods to lights on floor and 2 steps to icnrease WBign through R LE 3x60\".  Increased fatigue post but pt demosntrated midline COG during static stance and with ambulation post exerco   Skilled Intervention Cueing, guarding and demo   Physical Performance Test/measures   Physical Performance Test/Measurement, Minutes (13717) 5   Physical Performance " Test/Measurement Details TUG   Skilled Intervention Cueing and interpretation   Patient Response/Progress TUG 19.94 seconds on 5/28/25 with FWW 17.16 without AD   Education   Learner/Method Patient;Demonstration;Listening   Education Comments Education on POC and updated HEP   Plan   Home program see PTrx   Plan for next session How is back feeling? walking on uneven surface with hurdles, try tall kneeling to marching knee up again, theraband to hip flexion, tall kneeling PNF chops w/ DB/theraband (core strengthening), tall kneeling paloff press   Comments   Comments Pt presetns with increased L lateral shift today.  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  At end of session pt demonstrated improved symmetry in WBign through LEs   Total Session Time   Timed Code Treatment Minutes 44   Total Treatment Time (sum of timed and untimed services) 44         PLAN  Pt has met 2/4 LTG.  Pt presents with worse gait pattern and increased L lateral shift due to increased fatigue from activity over the extended weekend.  Following PT session today pt demonstrated improve COM in midline.  Limitation to faster progress have been pt's LBP.  Pt would benefit from continued skilled PT services at 1x/wk for 12 additional weeks to further reduce falls risk.      Beginning/End Dates of Progress Note Reporting Period:  02/27/25 to 05/28/2025    Referring Provider:  Leann NOVAK [facility]                                                                                   OUTPATIENT PHYSICAL THERAPY    PLAN OF TREATMENT FOR OUTPATIENT REHABILITATION   Patient's Last Name, First Name, KISHORELAURENT  CasandraDominguez bass YOB: 1965   Provider's Name   [facility]   Medical Record No.  [id number]     Onset Date: 02/20/25 (referral date)  Start of Care Date: 02/27/25     Medical Diagnosis:   R26.81 (ICD-10-CM) - Unsteadiness on feet  R29.6 (ICD-10-CM) - Frequent fall      PT Treatment Diagnosis:  Balance deficits, force productiond defcitis, gait abnormality Plan of Treatment  Frequency/Duration: 2x/week, decreasing as appropriate/ 12 weeks    Certification date from 05/28/25 to 08/25/25         See note for plan of treatment details and functional goals     Radha Morris, PT                         I CERTIFY THE NEED FOR THESE SERVICES FURNISHED UNDER        THIS PLAN OF TREATMENT AND WHILE UNDER MY CARE .             Physician Signature               Date    X_____________________________________________________                  Referring Provider:  Leann Matt    Initial Assessment  See Epic Evaluation- Start of Care Date: 02/27/25

## 2025-06-11 ENCOUNTER — THERAPY VISIT (OUTPATIENT)
Dept: PHYSICAL THERAPY | Facility: CLINIC | Age: 60
End: 2025-06-11
Attending: PHYSICIAN ASSISTANT
Payer: MEDICARE

## 2025-06-11 DIAGNOSIS — R29.6 FALLS: Primary | ICD-10-CM

## 2025-06-11 DIAGNOSIS — R26.81 UNSTEADINESS ON FEET: ICD-10-CM

## 2025-06-11 PROCEDURE — 97112 NEUROMUSCULAR REEDUCATION: CPT | Mod: GP | Performed by: PHYSICAL THERAPIST

## 2025-06-18 ENCOUNTER — THERAPY VISIT (OUTPATIENT)
Dept: PHYSICAL THERAPY | Facility: CLINIC | Age: 60
End: 2025-06-18
Attending: PHYSICIAN ASSISTANT
Payer: MEDICARE

## 2025-06-18 DIAGNOSIS — R26.81 UNSTEADINESS ON FEET: ICD-10-CM

## 2025-06-18 DIAGNOSIS — R29.6 FALLS: Primary | ICD-10-CM

## 2025-06-18 PROCEDURE — 97112 NEUROMUSCULAR REEDUCATION: CPT | Mod: GP | Performed by: PHYSICAL THERAPIST

## 2025-06-25 ENCOUNTER — THERAPY VISIT (OUTPATIENT)
Dept: PHYSICAL THERAPY | Facility: CLINIC | Age: 60
End: 2025-06-25
Attending: PHYSICIAN ASSISTANT
Payer: MEDICARE

## 2025-06-25 DIAGNOSIS — R29.6 FALLS: Primary | ICD-10-CM

## 2025-06-25 DIAGNOSIS — R26.81 UNSTEADINESS ON FEET: ICD-10-CM

## 2025-06-25 PROCEDURE — 97112 NEUROMUSCULAR REEDUCATION: CPT | Mod: GP | Performed by: PHYSICAL THERAPIST

## 2025-06-25 PROCEDURE — 97110 THERAPEUTIC EXERCISES: CPT | Mod: GP | Performed by: PHYSICAL THERAPIST

## 2025-08-13 ENCOUNTER — THERAPY VISIT (OUTPATIENT)
Dept: PHYSICAL THERAPY | Facility: CLINIC | Age: 60
End: 2025-08-13
Attending: PHYSICIAN ASSISTANT
Payer: MEDICARE

## 2025-08-13 DIAGNOSIS — R26.81 UNSTEADINESS ON FEET: ICD-10-CM

## 2025-08-13 DIAGNOSIS — R29.6 FALLS: Primary | ICD-10-CM

## 2025-08-13 PROCEDURE — 97110 THERAPEUTIC EXERCISES: CPT | Mod: GP | Performed by: PHYSICAL THERAPIST

## 2025-08-20 ENCOUNTER — THERAPY VISIT (OUTPATIENT)
Dept: PHYSICAL THERAPY | Facility: CLINIC | Age: 60
End: 2025-08-20
Attending: PHYSICIAN ASSISTANT
Payer: MEDICARE

## 2025-08-20 DIAGNOSIS — R26.81 UNSTEADINESS ON FEET: Primary | ICD-10-CM

## 2025-08-20 DIAGNOSIS — R29.6 FALLS: ICD-10-CM

## 2025-08-20 PROCEDURE — 97112 NEUROMUSCULAR REEDUCATION: CPT | Mod: GP | Performed by: PHYSICAL THERAPIST

## 2025-09-03 ENCOUNTER — THERAPY VISIT (OUTPATIENT)
Dept: PHYSICAL THERAPY | Facility: CLINIC | Age: 60
End: 2025-09-03
Attending: PHYSICIAN ASSISTANT
Payer: MEDICARE

## 2025-09-03 DIAGNOSIS — R29.6 FALLS: ICD-10-CM

## 2025-09-03 DIAGNOSIS — R26.81 UNSTEADINESS ON FEET: Primary | ICD-10-CM

## 2025-09-03 PROCEDURE — 97116 GAIT TRAINING THERAPY: CPT | Mod: GP | Performed by: PHYSICAL THERAPIST

## 2025-09-03 PROCEDURE — 97112 NEUROMUSCULAR REEDUCATION: CPT | Mod: GP | Performed by: PHYSICAL THERAPIST
